# Patient Record
Sex: MALE | Race: BLACK OR AFRICAN AMERICAN | NOT HISPANIC OR LATINO | Employment: UNEMPLOYED | URBAN - METROPOLITAN AREA
[De-identification: names, ages, dates, MRNs, and addresses within clinical notes are randomized per-mention and may not be internally consistent; named-entity substitution may affect disease eponyms.]

---

## 2017-01-12 DIAGNOSIS — K21.9 GASTROESOPHAGEAL REFLUX DISEASE WITHOUT ESOPHAGITIS: ICD-10-CM

## 2017-01-12 DIAGNOSIS — E55.9 VITAMIN D INSUFFICIENCY: Primary | ICD-10-CM

## 2017-01-12 NOTE — TELEPHONE ENCOUNTER
Cholecalciferol (VITAMIN D) 2000 UNITS tablet      Last Written Prescription Date: 111/27/15  Last Fill Quantity: 100,  # refills: 3   Last Office Visit with [facility] or [facility] prescribing provider: 11/27/15                                               omeprazole 20 MG tablet      Last Written Prescription Date: 11/27/15  Last Fill Quantity: 90,  # refills: 3   Last Office Visit with [facility], [facility] or [facility] prescribing provider: 11/27/15

## 2017-01-13 RX ORDER — CHOLECALCIFEROL (VITAMIN D3) 50 MCG
2000 TABLET ORAL DAILY
Qty: 100 TABLET | Refills: 3 | OUTPATIENT
Start: 2017-01-13

## 2017-01-13 RX ORDER — OMEPRAZOLE 40 MG/1
40 CAPSULE, DELAYED RELEASE ORAL DAILY
Qty: 30 CAPSULE | Refills: 11 | OUTPATIENT
Start: 2017-01-13

## 2017-02-14 ENCOUNTER — OFFICE VISIT (OUTPATIENT)
Dept: FAMILY MEDICINE | Facility: CLINIC | Age: 32
End: 2017-02-14
Payer: COMMERCIAL

## 2017-02-14 VITALS
OXYGEN SATURATION: 96 % | DIASTOLIC BLOOD PRESSURE: 64 MMHG | SYSTOLIC BLOOD PRESSURE: 112 MMHG | TEMPERATURE: 96.6 F | BODY MASS INDEX: 22.79 KG/M2 | HEIGHT: 71 IN | HEART RATE: 70 BPM | WEIGHT: 162.8 LBS

## 2017-02-14 DIAGNOSIS — Z00.00 ROUTINE HISTORY AND PHYSICAL EXAMINATION OF ADULT: Primary | ICD-10-CM

## 2017-02-14 DIAGNOSIS — E55.9 VITAMIN D INSUFFICIENCY: ICD-10-CM

## 2017-02-14 DIAGNOSIS — K21.9 GASTROESOPHAGEAL REFLUX DISEASE WITHOUT ESOPHAGITIS: ICD-10-CM

## 2017-02-14 DIAGNOSIS — R63.1 ALWAYS THIRSTY: ICD-10-CM

## 2017-02-14 DIAGNOSIS — Z22.7 LATENT TUBERCULOSIS: ICD-10-CM

## 2017-02-14 DIAGNOSIS — E78.5 HYPERLIPIDEMIA LDL GOAL <160: ICD-10-CM

## 2017-02-14 LAB
ALBUMIN UR-MCNC: NEGATIVE MG/DL
ALT SERPL W P-5'-P-CCNC: 28 U/L (ref 0–70)
ANION GAP SERPL CALCULATED.3IONS-SCNC: 5 MMOL/L (ref 3–14)
APPEARANCE UR: CLEAR
BILIRUB UR QL STRIP: NEGATIVE
BUN SERPL-MCNC: 15 MG/DL (ref 7–30)
CALCIUM SERPL-MCNC: 9.3 MG/DL (ref 8.5–10.1)
CHLORIDE SERPL-SCNC: 104 MMOL/L (ref 94–109)
CHOLEST SERPL-MCNC: 214 MG/DL
CO2 SERPL-SCNC: 31 MMOL/L (ref 20–32)
COLOR UR AUTO: YELLOW
CREAT SERPL-MCNC: 1.24 MG/DL (ref 0.66–1.25)
ERYTHROCYTE [DISTWIDTH] IN BLOOD BY AUTOMATED COUNT: 12.9 % (ref 10–15)
GFR SERPL CREATININE-BSD FRML MDRD: 68 ML/MIN/1.7M2
GLUCOSE SERPL-MCNC: 86 MG/DL (ref 70–99)
GLUCOSE UR STRIP-MCNC: NEGATIVE MG/DL
HBA1C MFR BLD: 5.2 % (ref 4.3–6)
HCT VFR BLD AUTO: 44.2 % (ref 40–53)
HDLC SERPL-MCNC: 42 MG/DL
HGB BLD-MCNC: 15.3 G/DL (ref 13.3–17.7)
HGB UR QL STRIP: NEGATIVE
KETONES UR STRIP-MCNC: NEGATIVE MG/DL
LDLC SERPL CALC-MCNC: 134 MG/DL
LEUKOCYTE ESTERASE UR QL STRIP: NEGATIVE
MCH RBC QN AUTO: 29.5 PG (ref 26.5–33)
MCHC RBC AUTO-ENTMCNC: 34.6 G/DL (ref 31.5–36.5)
MCV RBC AUTO: 85 FL (ref 78–100)
NITRATE UR QL: NEGATIVE
NONHDLC SERPL-MCNC: 172 MG/DL
PH UR STRIP: 7 PH (ref 5–7)
PLATELET # BLD AUTO: 242 10E9/L (ref 150–450)
POTASSIUM SERPL-SCNC: 4.1 MMOL/L (ref 3.4–5.3)
RBC # BLD AUTO: 5.18 10E12/L (ref 4.4–5.9)
SODIUM SERPL-SCNC: 140 MMOL/L (ref 133–144)
SP GR UR STRIP: 1.02 (ref 1–1.03)
TRIGL SERPL-MCNC: 188 MG/DL
URN SPEC COLLECT METH UR: NORMAL
UROBILINOGEN UR STRIP-ACNC: 0.2 EU/DL (ref 0.2–1)
WBC # BLD AUTO: 5.1 10E9/L (ref 4–11)

## 2017-02-14 PROCEDURE — 80061 LIPID PANEL: CPT | Performed by: FAMILY MEDICINE

## 2017-02-14 PROCEDURE — 85027 COMPLETE CBC AUTOMATED: CPT | Performed by: FAMILY MEDICINE

## 2017-02-14 PROCEDURE — 81003 URINALYSIS AUTO W/O SCOPE: CPT | Performed by: FAMILY MEDICINE

## 2017-02-14 PROCEDURE — 99395 PREV VISIT EST AGE 18-39: CPT | Performed by: FAMILY MEDICINE

## 2017-02-14 PROCEDURE — 87338 HPYLORI STOOL AG IA: CPT | Performed by: FAMILY MEDICINE

## 2017-02-14 PROCEDURE — 83036 HEMOGLOBIN GLYCOSYLATED A1C: CPT | Performed by: FAMILY MEDICINE

## 2017-02-14 PROCEDURE — 82306 VITAMIN D 25 HYDROXY: CPT | Performed by: FAMILY MEDICINE

## 2017-02-14 PROCEDURE — 80048 BASIC METABOLIC PNL TOTAL CA: CPT | Performed by: FAMILY MEDICINE

## 2017-02-14 PROCEDURE — 36415 COLL VENOUS BLD VENIPUNCTURE: CPT | Performed by: FAMILY MEDICINE

## 2017-02-14 PROCEDURE — 84460 ALANINE AMINO (ALT) (SGPT): CPT | Performed by: FAMILY MEDICINE

## 2017-02-14 RX ORDER — OMEPRAZOLE 40 MG/1
40 CAPSULE, DELAYED RELEASE ORAL DAILY
Qty: 30 CAPSULE | Refills: 11 | Status: SHIPPED | OUTPATIENT
Start: 2017-02-14 | End: 2018-01-09

## 2017-02-14 RX ORDER — ISONIAZID 300 MG/1
300 TABLET ORAL DAILY
Qty: 90 TABLET | Refills: 2 | Status: SHIPPED | OUTPATIENT
Start: 2017-02-14 | End: 2018-01-09

## 2017-02-14 NOTE — PROGRESS NOTES
SUBJECTIVE:     CC: Paul Alcantara is an 32 year old male who presents for preventative health visit.     Healthy Habits:    Do you get at least three servings of calcium containing foods daily (dairy, green leafy vegetables, etc.)? yes    Amount of exercise or daily activities, outside of work: none    Problems taking medications regularly not applicable    Medication side effects: No    Have you had an eye exam in the past two years? yes    Do you see a dentist twice per year? yes    Do you have sleep apnea, excessive snoring or daytime drowsiness?no        Insomnia,     Today's PHQ-2 Score:   PHQ-2 ( 1999 Pfizer) 2/14/2017 11/27/2015   Q1: Little interest or pleasure in doing things 0 0   Q2: Feeling down, depressed or hopeless 0 0   PHQ-2 Score 0 0       Abuse: Current or Past(Physical, Sexual or Emotional)- No  Do you feel safe in your environment - Yes    Social History   Substance Use Topics     Smoking status: Never Smoker     Smokeless tobacco: Not on file     Alcohol use No     The patient does not drink >3 drinks per day nor >7 drinks per week.    Last PSA: No results found for: PSA    Recent Labs   Lab Test  11/27/15   1344   CHOL  234*   HDL  54   LDL  166*   TRIG  68   NHDL  180*       Reviewed orders with patient. Reviewed health maintenance and updated orders accordingly - No    All Histories reviewed and updated in Epic.  History reviewed. No pertinent past medical history.   History reviewed. No pertinent past surgical history.    ROS: has Gastroesophageal reflux disease without esophagitis; Infertility management; Vitamin D insufficiency; Hyperlipidemia LDL goal <160; and Latent tuberculosis on his problem list.  C: NEGATIVE for fever, chills, change in weight  I: NEGATIVE for worrisome rashes, moles or lesions  E: NEGATIVE for vision changes or irritation  ENT: NEGATIVE for ear, mouth and throat problems  R: NEGATIVE for significant cough or SOB  CV: NEGATIVE for chest pain, palpitations  or peripheral edema  GI: POSITIVE for abdominal pain epigastric, gas or bloating, heartburn or reflux and nausea   male: negative for dysuria, hematuria, decreased urinary stream, erectile dysfunction, urethral discharge  HISTORY OF PAINFUL INTERMITTENT TESTES   DISCHARGE WHEN STRAINING AT STOOL  INFERTILITY WORRIES   M: NEGATIVE for significant arthralgias or myalgia  N: NEGATIVE for weakness, dizziness or paresthesias  E: NEGATIVE for temperature intolerance, skin/hair changes  INCREASED THIRST AND HUNGER   H: NEGATIVE for bleeding problems  P: NEGATIVE for changes in mood or affect      Problem list, Medication list, Allergies, and Medical/Social/Surgical histories reviewed in [facility] and updated as appropriate.  Labs reviewed in EPIC  BP Readings from Last 3 Encounters:   02/14/17 112/64   11/27/15 106/72    Wt Readings from Last 3 Encounters:   02/14/17 162 lb 12.8 oz (73.8 kg)   11/27/15 158 lb 12.8 oz (72 kg)                  Patient Active Problem List   Diagnosis     Gastroesophageal reflux disease without esophagitis     Infertility management     Vitamin D insufficiency     Hyperlipidemia LDL goal <160     Latent tuberculosis     History reviewed. No pertinent past surgical history.    Social History   Substance Use Topics     Smoking status: Never Smoker     Smokeless tobacco: Not on file     Alcohol use No     Family History   Problem Relation Age of Onset     Family History Negative Mother      Family History Negative Father          Current Outpatient Prescriptions   Medication Sig Dispense Refill     omeprazole (PRILOSEC) 40 MG capsule Take 1 capsule (40 mg) by mouth daily Take 30-60 minutes before a meal. 30 capsule 11     ranitidine (ZANTAC) 150 MG tablet Take 1 tablet (150 mg) by mouth 2 times daily 60 tablet 11     isoniazid (NYDRAZID) 300 MG tablet Take 1 tablet (300 mg) by mouth daily 90 tablet 2     Cholecalciferol (VITAMIN D) 2000 UNITS tablet Take 2,000 Units by mouth daily 100 tablet 3  "    No Known Allergies  Recent Labs   Lab Test  02/14/17   0921  11/27/15   1344   A1C  5.2   --    LDL   --   166*   HDL   --   54   TRIG   --   68      OBJECTIVE:     /64 (BP Location: Right arm, Cuff Size: Adult Large)  Pulse 70  Temp 96.6  F (35.9  C) (Tympanic)  Ht 5' 10.5\" (1.791 m)  Wt 162 lb 12.8 oz (73.8 kg)  SpO2 96%  BMI 23.03 kg/m2  EXAM:  GENERAL: healthy, alert and no distress  EYES: Eyes grossly normal to inspection, PERRL and conjunctivae and sclerae normal  HENT: ear canals and TM's normal, nose and mouth without ulcers or lesions  NECK: no adenopathy, no asymmetry, masses, or scars and thyroid normal to palpation  RESP: lungs clear to auscultation - no rales, rhonchi or wheezes  CV: regular rate and rhythm, normal S1 S2, no S3 or S4, no murmur, click or rub, no peripheral edema and peripheral pulses strong  ABDOMEN: soft, nontender, no hepatosplenomegaly, no masses and bowel sounds normal   (male): normal male genitalia without lesions or urethral discharge, no hernia  MS: no gross musculoskeletal defects noted, no edema  SKIN: no suspicious lesions or rashes  NEURO: Normal strength and tone, mentation intact and speech normal  PSYCH: mentation appears normal, affect normal/bright  LYMPH: no cervical, supraclavicular, axillary, or inguinal adenopathy  Diabetic foot exam: normal DP and PT pulses, no trophic changes or ulcerative lesions, normal sensory exam and normal monofilament exam    ASSESSMENT/PLAN:         ICD-10-CM    1. Routine history and physical examination of adult Z00.00 UA reflex to Microscopic and Culture   2. Gastroesophageal reflux disease without esophagitis K21.9 omeprazole (PRILOSEC) 40 MG capsule     H Pylori antigen stool     ranitidine (ZANTAC) 150 MG tablet   3. Latent tuberculosis R76.11 ALT     isoniazid (NYDRAZID) 300 MG tablet   4. Vitamin D insufficiency E55.9 Vitamin D Deficiency   5. Hyperlipidemia LDL goal <160 E78.5 Lipid panel reflex to direct LDL   6. " "Always thirsty R63.1 Hemoglobin A1c     Basic metabolic panel     CBC with platelets       COUNSELING:  Reviewed preventive health counseling, as reflected in patient instructions        DELAYED TB POSITIVE TREATMENT       Regular exercise       Healthy diet/nutrition       Vision screening       Hearing screening         reports that he has never smoked. He does not have any smokeless tobacco history on file.    Estimated body mass index is 23.03 kg/(m^2) as calculated from the following:    Height as of this encounter: 5' 10.5\" (1.791 m).    Weight as of this encounter: 162 lb 12.8 oz (73.8 kg).       Counseling Resources:  ATP IV Guidelines  Pooled Cohorts Equation Calculator  FRAX Risk Assessment  ICSI Preventive Guidelines  Dietary Guidelines for Americans, 2010  USDA's MyPlate  ASA Prophylaxis  Lung CA Screening    AMINATA REYES MD  [facility]  "

## 2017-02-14 NOTE — NURSING NOTE
"Chief Complaint   Patient presents with     Physical       Initial /64 (BP Location: Right arm, Cuff Size: Adult Large)  Pulse 70  Temp 96.6  F (35.9  C) (Tympanic)  Ht 5' 10.5\" (1.791 m)  Wt 162 lb 12.8 oz (73.8 kg)  SpO2 96%  BMI 23.03 kg/m2 Estimated body mass index is 23.03 kg/(m^2) as calculated from the following:    Height as of this encounter: 5' 10.5\" (1.791 m).    Weight as of this encounter: 162 lb 12.8 oz (73.8 kg).  Medication Reconciliation: complete   Hortencia Gómez CMA    "

## 2017-02-14 NOTE — LETTER
"      [address]  [address]  [facility] [address] [phone]                                                                                                           Paul Sandhu Ise  [address]   [facility] [address]    February 15, 2017      Dear Paul,    The results of your recent tests were reviewed and are enclosed.     YOU HAVE H PYLORI   INFECTION IN STOMACH PLEASE COME BACK FOR ANTIBIOTIC TREATMENT   NORMAL URINE ANALYSIS   NORMAL COMPLETE BLOOD PANEL WBCS RBCS AND PLTS   NORMAL THREE MONTH GLUCOSE AVERAGE   NORMAL LIVER FUNCTION TEST   NORMAL GLUCOSE, RENAL AND BLOOD SALTS    BORDERLINE  TOTAL CHOLESTEROL   BORDERLINE  TRIGLYCERIDES   NORMAL HDL OR \"GOOD\" CHOLESTEROL   BORDERLINE  HIGH LDL OR \"BAD\" CHOLESTEROL   BORDERLINE  VERY LOW DENSITY CHOLESTEROL   Results for orders placed or performed in visit on 02/14/17   Hemoglobin A1c   Result Value Ref Range    Hemoglobin A1C 5.2 4.3 - 6.0 %   Basic metabolic panel   Result Value Ref Range    Sodium 140 133 - 144 mmol/L    Potassium 4.1 3.4 - 5.3 mmol/L    Chloride 104 94 - 109 mmol/L    Carbon Dioxide 31 20 - 32 mmol/L    Anion Gap 5 3 - 14 mmol/L    Glucose 86 70 - 99 mg/dL    Urea Nitrogen 15 7 - 30 mg/dL    Creatinine 1.24 0.66 - 1.25 mg/dL    GFR Estimate 68 >60 mL/min/1.7m2    GFR Estimate If Black 82 >60 mL/min/1.7m2    Calcium 9.3 8.5 - 10.1 mg/dL   Lipid panel reflex to direct LDL   Result Value Ref Range    Cholesterol 214 (H) <200 mg/dL    Triglycerides 188 (H) <150 mg/dL    HDL Cholesterol 42 >39 mg/dL    LDL Cholesterol Calculated 134 (H) <100 mg/dL    Non HDL Cholesterol 172 (H) <130 mg/dL   CBC with platelets   Result Value Ref Range    WBC 5.1 4.0 - 11.0 10e9/L    RBC Count 5.18 4.4 - 5.9 10e12/L    Hemoglobin 15.3 13.3 - 17.7 g/dL    Hematocrit 44.2 40.0 - 53.0 %    MCV 85 78 - 100 fl    MCH 29.5 26.5 - 33.0 pg    MCHC 34.6 31.5 - 36.5 g/dL    RDW 12.9 10.0 " - 15.0 %    Platelet Count 242 150 - 450 10e9/L   Vitamin D Deficiency   Result Value Ref Range    Vitamin D Deficiency screening 23 20 - 75 ug/L   UA reflex to Microscopic and Culture   Result Value Ref Range    Color Urine Yellow     Appearance Urine Clear     Glucose Urine Negative NEG mg/dL    Bilirubin Urine Negative NEG    Ketones Urine Negative NEG mg/dL    Specific Gravity Urine 1.020 1.003 - 1.035    Blood Urine Negative NEG    pH Urine 7.0 5.0 - 7.0 pH    Protein Albumin Urine Negative NEG mg/dL    Urobilinogen Urine 0.2 0.2 - 1.0 EU/dL    Nitrite Urine Negative NEG    Leukocyte Esterase Urine Negative NEG    Source Midstream Urine    ALT   Result Value Ref Range    ALT 28 0 - 70 U/L   H Pylori antigen stool   Result Value Ref Range    Specimen Description Feces     H Pylori Antigen (A)      Positive for Helicobacter pylori antigen by enzyme immunoassay. A positive   result indicates the presence of H. pylori antigen.      Micro Report Status FINAL 02/15/2017              Thank you for choosing [facility].  We appreciate the opportunity to serve you and look forward to supporting your healthcare needs in the future.    If you have any questions or concerns, please call me or my staff at [phone].      Sincerely,    Bandar Terry Jr MD

## 2017-02-14 NOTE — LETTER
"      [address]  [address]  [facility] [address] [phone]                                                                                                           Paul Sandhu Ise  [address]   [facility] [address]    February 17, 2017      Dear Paul,    The results of your recent tests were reviewed and are enclosed.   VITAMIN D OK KEEP TAKING VITAMIN D   YOU HAVE H PYLORI   INFECTION IN STOMACH PLEASE COME BACK FOR ANTIBIOTIC TREATMENT   NORMAL URINE ANALYSIS   NORMAL COMPLETE BLOOD PANEL WBCS RBCS AND PLTS   NORMAL THREE MONTH GLUCOSE AVERAGE   NORMAL LIVER FUNCTION TEST   NORMAL GLUCOSE, RENAL AND BLOOD SALTS    BORDERLINE  TOTAL CHOLESTEROL   BORDERLINE  TRIGLYCERIDES   NORMAL HDL OR \"GOOD\" CHOLESTEROL   BORDERLINE  HIGH LDL OR \"BAD\" CHOLESTEROL   BORDERLINE  VERY LOW DENSITY CHOLESTEROL   Results for orders placed or performed in visit on 02/14/17   Hemoglobin A1c   Result Value Ref Range    Hemoglobin A1C 5.2 4.3 - 6.0 %   Basic metabolic panel   Result Value Ref Range    Sodium 140 133 - 144 mmol/L    Potassium 4.1 3.4 - 5.3 mmol/L    Chloride 104 94 - 109 mmol/L    Carbon Dioxide 31 20 - 32 mmol/L    Anion Gap 5 3 - 14 mmol/L    Glucose 86 70 - 99 mg/dL    Urea Nitrogen 15 7 - 30 mg/dL    Creatinine 1.24 0.66 - 1.25 mg/dL    GFR Estimate 68 >60 mL/min/1.7m2    GFR Estimate If Black 82 >60 mL/min/1.7m2    Calcium 9.3 8.5 - 10.1 mg/dL   Lipid panel reflex to direct LDL   Result Value Ref Range    Cholesterol 214 (H) <200 mg/dL    Triglycerides 188 (H) <150 mg/dL    HDL Cholesterol 42 >39 mg/dL    LDL Cholesterol Calculated 134 (H) <100 mg/dL    Non HDL Cholesterol 172 (H) <130 mg/dL   CBC with platelets   Result Value Ref Range    WBC 5.1 4.0 - 11.0 10e9/L    RBC Count 5.18 4.4 - 5.9 10e12/L    Hemoglobin 15.3 13.3 - 17.7 g/dL    Hematocrit 44.2 40.0 - 53.0 %    MCV 85 78 - 100 fl    MCH 29.5 26.5 - 33.0 pg    MCHC 34.6 " 31.5 - 36.5 g/dL    RDW 12.9 10.0 - 15.0 %    Platelet Count 242 150 - 450 10e9/L   Vitamin D Deficiency   Result Value Ref Range    Vitamin D Deficiency screening 23 20 - 75 ug/L   UA reflex to Microscopic and Culture   Result Value Ref Range    Color Urine Yellow     Appearance Urine Clear     Glucose Urine Negative NEG mg/dL    Bilirubin Urine Negative NEG    Ketones Urine Negative NEG mg/dL    Specific Gravity Urine 1.020 1.003 - 1.035    Blood Urine Negative NEG    pH Urine 7.0 5.0 - 7.0 pH    Protein Albumin Urine Negative NEG mg/dL    Urobilinogen Urine 0.2 0.2 - 1.0 EU/dL    Nitrite Urine Negative NEG    Leukocyte Esterase Urine Negative NEG    Source Midstream Urine    ALT   Result Value Ref Range    ALT 28 0 - 70 U/L   H Pylori antigen stool   Result Value Ref Range    Specimen Description Feces     H Pylori Antigen (A)      Positive for Helicobacter pylori antigen by enzyme immunoassay. A positive   result indicates the presence of H. pylori antigen.      Micro Report Status FINAL 02/15/2017      Thank you for choosing [facility].  We appreciate the opportunity to serve you and look forward to supporting your healthcare needs in the future.    If you have any questions or concerns, please call me or my staff at [phone].      Sincerely,    Bandar Terry Jr MD

## 2017-02-14 NOTE — MR AVS SNAPSHOT
After Visit Summary   2/14/2017    Paul Alcantara    MRN: [id number]           Patient Information     Date Of Birth          1985        Visit Information        Provider Department      2/14/2017 8:00 AM Bandar Terry MD [facility]        Today's Diagnoses     Routine history and physical examination of adult    -  1    Gastroesophageal reflux disease without esophagitis        Latent tuberculosis        Vitamin D insufficiency        Hyperlipidemia LDL goal <160        Always thirsty          Care Instructions      Preventive Health Recommendations  Male Ages 26 - 39    Yearly exam:             See your health care provider every year in order to  o   Review health changes.   o   Discuss preventive care.    o   Review your medicines if your doctor has prescribed any.    You should be tested each year for STDs (sexually transmitted diseases), if you re at risk.     After age 35, talk to your provider about cholesterol testing. If you are at risk for heart disease, have your cholesterol tested at least every 5 years.     If you are at risk for diabetes, you should have a diabetes test (fasting glucose).  Shots: Get a flu shot each year. Get a tetanus shot every 10 years.     Nutrition:    Eat at least 5 servings of fruits and vegetables daily.     Eat whole-grain bread, whole-wheat pasta and brown rice instead of white grains and rice.     Talk to your provider about Calcium and Vitamin D.     Lifestyle    Exercise for at least 150 minutes a week (30 minutes a day, 5 days a week). This will help you control your weight and prevent disease.     Limit alcohol to one drink per day.     No smoking.     Wear sunscreen to prevent skin cancer.     See your dentist every six months for an exam and cleaning.           Follow-ups after your visit        Follow-up notes from your care team     Return in about 1 week (around 2/21/2017) for Routine Visit.     "  Future tests that were ordered for you today     Open Future Orders        Priority Expected Expires Ordered    H Pylori antigen stool Routine  3/16/2017 2017            Who to contact     If you have questions or need follow up information about today's clinic visit or your schedule please contact [facility] directly at [phone].  Normal or non-critical lab and imaging results will be communicated to you by MyChart, letter or phone within 4 business days after the clinic has received the results. If you do not hear from us within 7 days, please contact the clinic through [employer] or phone. If you have a critical or abnormal lab result, we will notify you by phone as soon as possible.  Submit refill requests through [employer] or call your pharmacy and they will forward the refill request to us. Please allow 3 business days for your refill to be completed.          Additional Information About Your Visit        [employer] Information     [employer] lets you send messages to your doctor, view your test results, renew your prescriptions, schedule appointments and more. To sign up, go to [email or web address] . Click on \"Log in\" on the left side of the screen, which will take you to the Welcome page. Then click on \"Sign up Now\" on the right side of the page.     You will be asked to enter the access code listed below, as well as some personal information. Please follow the directions to create your username and password.     Your access code is: [email or web address]  Expires: 5/15/2017 12:36 PM     Your access code will  in 90 days. If you need help or a new code, please call your [facility] clinic or [phone].        Care EveryWhere ID     This is your Care EveryWhere ID. This could be used by other organizations to access your [facility] medical records  [id number]        Your Vitals Were     Pulse Temperature Height Pulse Oximetry BMI (Body Mass Index)       70 96.6  F (35.9  C) " "(Tympanic) 5' 10.5\" (1.791 m) 96% 23.03 kg/m2        Blood Pressure from Last 3 Encounters:   02/14/17 112/64   11/27/15 106/72    Weight from Last 3 Encounters:   02/14/17 162 lb 12.8 oz (73.8 kg)   11/27/15 158 lb 12.8 oz (72 kg)              We Performed the Following     ALT     Basic metabolic panel     CBC with platelets     Hemoglobin A1c     Lipid panel reflex to direct LDL     UA reflex to Microscopic and Culture     Vitamin D Deficiency          Today's Medication Changes          These changes are accurate as of: 2/14/17 12:36 PM.  If you have any questions, ask your nurse or doctor.               Start taking these medicines.        Dose/Directions    isoniazid 300 MG tablet   Commonly known as:  NYDRAZID   Used for:  Latent tuberculosis   Started by:  Bandar Terry MD        Dose:  300 mg   Take 1 tablet (300 mg) by mouth daily   Quantity:  90 tablet   Refills:  2       ranitidine 150 MG tablet   Commonly known as:  ZANTAC   Used for:  Gastroesophageal reflux disease without esophagitis   Started by:  Bandar Terry MD        Dose:  150 mg   Take 1 tablet (150 mg) by mouth 2 times daily   Quantity:  60 tablet   Refills:  11            Where to get your medicines      These medications were sent to [facility]/pharmacy #[id number] [address] [address]     Phone:  [phone]     isoniazid 300 MG tablet    omeprazole 40 MG capsule    ranitidine 150 MG tablet                Primary Care Provider    None Specified       No primary provider on file.        Thank you!     Thank you for choosing [facility]  for your care. Our goal is always to provide you with excellent care. Hearing back from our patients is one way we can continue to improve our services. Please take a few minutes to complete the written survey that you may receive in the mail after your visit with us. Thank you!             Your Updated " Medication List - Protect others around you: Learn how to safely use, store and throw away your medicines at www.disposemymeds.org.          This list is accurate as of: 2/14/17 12:36 PM.  Always use your most recent med list.                   Brand Name Dispense Instructions for use    isoniazid 300 MG tablet    NYDRAZID    90 tablet    Take 1 tablet (300 mg) by mouth daily       omeprazole 40 MG capsule    priLOSEC    30 capsule    Take 1 capsule (40 mg) by mouth daily Take 30-60 minutes before a meal.       ranitidine 150 MG tablet    ZANTAC    60 tablet    Take 1 tablet (150 mg) by mouth 2 times daily       vitamin D 2000 UNITS tablet     100 tablet    Take 2,000 Units by mouth daily

## 2017-02-14 NOTE — PATIENT INSTRUCTIONS
Preventive Health Recommendations  Male Ages 26 - 39    Yearly exam:             See your health care provider every year in order to  o   Review health changes.   o   Discuss preventive care.    o   Review your medicines if your doctor has prescribed any.    You should be tested each year for STDs (sexually transmitted diseases), if you re at risk.     After age 35, talk to your provider about cholesterol testing. If you are at risk for heart disease, have your cholesterol tested at least every 5 years.     If you are at risk for diabetes, you should have a diabetes test (fasting glucose).  Shots: Get a flu shot each year. Get a tetanus shot every 10 years.     Nutrition:    Eat at least 5 servings of fruits and vegetables daily.     Eat whole-grain bread, whole-wheat pasta and brown rice instead of white grains and rice.     Talk to your provider about Calcium and Vitamin D.     Lifestyle    Exercise for at least 150 minutes a week (30 minutes a day, 5 days a week). This will help you control your weight and prevent disease.     Limit alcohol to one drink per day.     No smoking.     Wear sunscreen to prevent skin cancer.     See your dentist every six months for an exam and cleaning.

## 2017-02-15 LAB
DEPRECATED CALCIDIOL+CALCIFEROL SERPL-MC: 23 UG/L (ref 20–75)
H PYLORI AG STL QL IA: ABNORMAL
MICRO REPORT STATUS: ABNORMAL
SPECIMEN SOURCE: ABNORMAL

## 2017-02-15 NOTE — PROGRESS NOTES
"NORMAL URINE ANALYSIS   NORMAL COMPLETE BLOOD PANEL WBCS RBCS AND PLTS   NORMAL THREE MONTH GLUCOSE AVERAGE   NORMAL LIVER FUNCTION TEST   NORMAL GLUCOSE, RENAL AND BLOOD SALTS    BORDERLINE  TOTAL CHOLESTEROL   BORDERLINE  TRIGLYCERIDES   NORMAL HDL OR \"GOOD\" CHOLESTEROL   BORDERLINE  HIGH LDL OR \"BAD\" CHOLESTEROL   BORDERLINE  VERY LOW DENSITY CHOLESTEROL     AMINATA REYES JR., MD"

## 2017-02-16 NOTE — PROGRESS NOTES
"Please send normal lab letter when labs are complete  VITAMIN D OK KEEP TAKING VITAMIN D   YOU HAVE H PYLORI   INFECTION IN STOMACH PLEASE COME BACK FOR ANTIBIOTIC TREATMENT   NORMAL URINE ANALYSIS   NORMAL COMPLETE BLOOD PANEL WBCS RBCS AND PLTS   NORMAL THREE MONTH GLUCOSE AVERAGE   NORMAL LIVER FUNCTION TEST   NORMAL GLUCOSE, RENAL AND BLOOD SALTS    BORDERLINE  TOTAL CHOLESTEROL   BORDERLINE  TRIGLYCERIDES   NORMAL HDL OR \"GOOD\" CHOLESTEROL   BORDERLINE  HIGH LDL OR \"BAD\" CHOLESTEROL   BORDERLINE  VERY LOW DENSITY CHOLESTEROL   AMINATA REYES JR., MD"

## 2017-05-30 ENCOUNTER — TELEPHONE (OUTPATIENT)
Dept: NURSING | Facility: CLINIC | Age: 32
End: 2017-05-30

## 2017-05-30 NOTE — TELEPHONE ENCOUNTER
Paul's wife called in and wanted appt for her . She states that he has shortness of breath. It started last night when he layed down.  She thinks that his color is OK. Doesn't think he is clammy. Thinks his mucous is clear. Does not know if he has asthma, doen's think he can take as deep a breath. Not sure about temp.  Dr Terry is off until Thursday and then his schedule is full until Tuesday.  They will go to the ER

## 2017-12-02 DIAGNOSIS — Z22.7 LATENT TUBERCULOSIS: ICD-10-CM

## 2017-12-06 NOTE — TELEPHONE ENCOUNTER
LOV 2/14/17    Requested Prescriptions   Pending Prescriptions Disp Refills     isoniazid (NYDRAZID) 300 MG tablet [Pharmacy Med Name: ISONIAZID 300 MG TABLET] 90 tablet 2     Sig: TAKE 1 TABLET (300 MG) BY MOUTH DAILY    There is no refill protocol information for this order

## 2018-01-09 ENCOUNTER — OFFICE VISIT (OUTPATIENT)
Dept: FAMILY MEDICINE | Facility: CLINIC | Age: 33
End: 2018-01-09
Payer: COMMERCIAL

## 2018-01-09 ENCOUNTER — RADIANT APPOINTMENT (OUTPATIENT)
Dept: GENERAL RADIOLOGY | Facility: CLINIC | Age: 33
End: 2018-01-09
Attending: FAMILY MEDICINE
Payer: COMMERCIAL

## 2018-01-09 VITALS
TEMPERATURE: 96.7 F | RESPIRATION RATE: 18 BRPM | SYSTOLIC BLOOD PRESSURE: 98 MMHG | BODY MASS INDEX: 23.2 KG/M2 | DIASTOLIC BLOOD PRESSURE: 64 MMHG | WEIGHT: 164 LBS | HEART RATE: 64 BPM

## 2018-01-09 DIAGNOSIS — L70.0 ACNE VULGARIS: ICD-10-CM

## 2018-01-09 DIAGNOSIS — R07.89 CHEST WALL PAIN: ICD-10-CM

## 2018-01-09 DIAGNOSIS — J98.4 SCARRING OF LUNG: ICD-10-CM

## 2018-01-09 DIAGNOSIS — E78.5 HYPERLIPIDEMIA LDL GOAL <160: ICD-10-CM

## 2018-01-09 DIAGNOSIS — K21.9 GASTROESOPHAGEAL REFLUX DISEASE WITHOUT ESOPHAGITIS: Primary | ICD-10-CM

## 2018-01-09 DIAGNOSIS — G44.219 EPISODIC TENSION-TYPE HEADACHE, NOT INTRACTABLE: ICD-10-CM

## 2018-01-09 DIAGNOSIS — G47.62 NOCTURNAL LEG CRAMPS: ICD-10-CM

## 2018-01-09 DIAGNOSIS — E55.9 VITAMIN D INSUFFICIENCY: ICD-10-CM

## 2018-01-09 PROCEDURE — 99214 OFFICE O/P EST MOD 30 MIN: CPT | Performed by: FAMILY MEDICINE

## 2018-01-09 PROCEDURE — 71046 X-RAY EXAM CHEST 2 VIEWS: CPT | Mod: FY

## 2018-01-09 RX ORDER — ASPIRIN 81 MG
TABLET,CHEWABLE ORAL AT BEDTIME
Qty: 56 G | Refills: 11 | Status: ON HOLD | OUTPATIENT
Start: 2018-01-09 | End: 2018-04-08

## 2018-01-09 RX ORDER — ACETAMINOPHEN 500 MG
1000 TABLET ORAL 2 TIMES DAILY PRN
Qty: 100 TABLET | Refills: 3 | Status: ON HOLD | OUTPATIENT
Start: 2018-01-09 | End: 2018-04-08

## 2018-01-09 RX ORDER — OMEPRAZOLE 40 MG/1
40 CAPSULE, DELAYED RELEASE ORAL DAILY
Qty: 30 CAPSULE | Refills: 11 | Status: SHIPPED | OUTPATIENT
Start: 2018-01-09 | End: 2019-03-04

## 2018-01-09 RX ORDER — CHOLECALCIFEROL (VITAMIN D3) 50 MCG
2000 TABLET ORAL DAILY
Qty: 100 TABLET | Refills: 11 | Status: SHIPPED | OUTPATIENT
Start: 2018-01-09 | End: 2019-03-04

## 2018-01-09 NOTE — PATIENT INSTRUCTIONS
(K21.9) Gastroesophageal reflux disease without esophagitis  (primary encounter diagnosis)  Comment:    Plan: omeprazole (PRILOSEC) 40 MG capsule             (E55.9) Vitamin D insufficiency  Comment:    Plan: Cholecalciferol (VITAMIN D) 2000 UNITS tablet             (E78.5) Hyperlipidemia LDL goal <160  Comment:    Plan:      (R07.89) Chest wall pain  Comment:    Plan: XR Chest 2 Views             (G47.62) Nocturnal leg cramps  Comment:    Plan: Capsaicin 0.1 % cream             (G44.219) Episodic tension-type headache, not intractable  Comment:    Plan: acetaminophen (TYLENOL) 500 MG tablet

## 2018-01-09 NOTE — LETTER
January 10, 2018      Paul Godoybetty Alcantara  [address]   [facility] [address]        Dear Mr.Abdullahi Alcantara,    We are writing to inform you of your test results.        Resulted Orders   XR Chest 2 Views    Narrative    XR CHEST 2 VW   1/9/2018 11:53 AM     HISTORY: ; Chest wall pain    COMPARISON: None.      Impression    IMPRESSION: Chronic interstitial/bronchiectatic changes in the lungs.  No definite acute infiltrate.    TAMMIE LLOYD MD       If you have any questions or concerns, please call the clinic at the number listed above.       Sincerely,        Bandar Terry MD

## 2018-01-09 NOTE — MR AVS SNAPSHOT
After Visit Summary   1/9/2018    Paul Alcantara    MRN: [id number]           Patient Information     Date Of Birth          1985        Visit Information        Provider Department      1/9/2018 10:45 AM Bandar Terry MD [facility]        Today's Diagnoses     Gastroesophageal reflux disease without esophagitis    -  1    Vitamin D insufficiency        Hyperlipidemia LDL goal <160        Chest wall pain        Nocturnal leg cramps        Episodic tension-type headache, not intractable          Care Instructions    (K21.9) Gastroesophageal reflux disease without esophagitis  (primary encounter diagnosis)  Comment:    Plan: omeprazole (PRILOSEC) 40 MG capsule             (E55.9) Vitamin D insufficiency  Comment:    Plan: Cholecalciferol (VITAMIN D) 2000 UNITS tablet             (E78.5) Hyperlipidemia LDL goal <160  Comment:    Plan:      (R07.89) Chest wall pain  Comment:    Plan: XR Chest 2 Views             (G47.62) Nocturnal leg cramps  Comment:    Plan: Capsaicin 0.1 % cream             (G44.219) Episodic tension-type headache, not intractable  Comment:    Plan: acetaminophen (TYLENOL) 500 MG tablet                            Follow-ups after your visit        Future tests that were ordered for you today     Open Future Orders        Priority Expected Expires Ordered    XR Chest 2 Views Routine 1/9/2018 1/9/2019 1/9/2018            Who to contact     If you have questions or need follow up information about today's clinic visit or your schedule please contact [facility] directly at [phone].  Normal or non-critical lab and imaging results will be communicated to you by MyChart, letter or phone within 4 business days after the clinic has received the results. If you do not hear from us within 7 days, please contact the clinic through MyChart or phone. If you have a critical or abnormal lab result, we will  "notify you by phone as soon as possible.  Submit refill requests through [employer] or call your pharmacy and they will forward the refill request to us. Please allow 3 business days for your refill to be completed.          Additional Information About Your Visit        [email or web address] Information     [employer] lets you send messages to your doctor, view your test results, renew your prescriptions, schedule appointments and more. To sign up, go to [email or web address] . Click on \"Log in\" on the left side of the screen, which will take you to the Welcome page. Then click on \"Sign up Now\" on the right side of the page.     You will be asked to enter the access code listed below, as well as some personal information. Please follow the directions to create your username and password.     Your access code is: [email or web address]  Expires: 2018 11:23 AM     Your access code will  in 90 days. If you need help or a new code, please call your [facility] clinic or [phone].        Care EveryWhere ID     This is your Care EveryWhere ID. This could be used by other organizations to access your [facility] medical records  [id number]        Your Vitals Were     Pulse Temperature Respirations BMI (Body Mass Index)          64 96.7  F (35.9  C) (Tympanic) 18 23.2 kg/m2         Blood Pressure from Last 3 Encounters:   18 98/64   17 112/64   11/27/15 106/72    Weight from Last 3 Encounters:   18 164 lb (74.4 kg)   17 162 lb 12.8 oz (73.8 kg)   11/27/15 158 lb 12.8 oz (72 kg)                 Today's Medication Changes          These changes are accurate as of: 18 11:23 AM.  If you have any questions, ask your nurse or doctor.               Start taking these medicines.        Dose/Directions    acetaminophen 500 MG tablet   Commonly known as:  TYLENOL   Used for:  Episodic tension-type headache, not intractable   Started by:  Bandar Terry MD        Dose:  1000 mg   Take 2 tablets (1,000 mg) by mouth " 2 times daily as needed for mild pain   Quantity:  100 tablet   Refills:  3       Capsaicin 0.1 % cream   Used for:  Nocturnal leg cramps   Started by:  Bandar Terry MD        Apply topically At Bedtime   Quantity:  56 g   Refills:  11            Where to get your medicines      These medications were sent to [facility]/pharmacy #[id number] - [place], [place] - [address]  [address] [address]     Phone:  [phone]     acetaminophen 500 MG tablet    Capsaicin 0.1 % cream    omeprazole 40 MG capsule    vitamin D 2000 UNITS tablet                Primary Care Provider Office Phone # Fax #    Bandar Terry [phone] [address]  [facility] [address]        Equal Access to Services     MARCOS MONTES : Hadii afshin almanzar hadasho Soomaali, waaxda luqadaha, qaybta kaalmada adeegyada, waxay tate segura . So [facility] [phone].    ATENCIÓN: Si habla español, tiene a machado disposición servicios gratuitos de asistencia lingüística. Llame al [phone].    We comply with applicable federal civil rights laws and [place] laws. We do not discriminate on the basis of race, color, national origin, age, disability, sex, sexual orientation, or gender identity.            Thank you!     Thank you for choosing [facility]  for your care. Our goal is always to provide you with excellent care. Hearing back from our patients is one way we can continue to improve our services. Please take a few minutes to complete the written survey that you may receive in the mail after your visit with us. Thank you!             Your Updated Medication List - Protect others around you: Learn how to safely use, store and throw away your medicines at www.disposemymeds.org.          This list is accurate as of: 1/9/18 11:23 AM.  Always use your most recent med list.                   Brand Name Dispense Instructions for use Diagnosis     acetaminophen 500 MG tablet    TYLENOL    100 tablet    Take 2 tablets (1,000 mg) by mouth 2 times daily as needed for mild pain    Episodic tension-type headache, not intractable       Capsaicin 0.1 % cream     56 g    Apply topically At Bedtime    Nocturnal leg cramps       omeprazole 40 MG capsule    priLOSEC    30 capsule    Take 1 capsule (40 mg) by mouth daily Take 30-60 minutes before a meal.    Gastroesophageal reflux disease without esophagitis       ranitidine 150 MG tablet    ZANTAC    60 tablet    Take 1 tablet (150 mg) by mouth 2 times daily    Gastroesophageal reflux disease without esophagitis       vitamin D 2000 UNITS tablet     100 tablet    Take 2,000 Units by mouth daily    Vitamin D insufficiency
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
## 2018-01-09 NOTE — NURSING NOTE
"Chief Complaint   Patient presents with     Pain     pain in arms and legs       Initial BP 98/64  Pulse 64  Temp 96.7  F (35.9  C) (Tympanic)  Resp 18  Wt 164 lb (74.4 kg)  BMI 23.2 kg/m2 Estimated body mass index is 23.2 kg/(m^2) as calculated from the following:    Height as of 2/14/17: 5' 10.5\" (1.791 m).    Weight as of this encounter: 164 lb (74.4 kg).  Medication Reconciliation: complete   Hortencia Góemz CMA    "

## 2018-01-09 NOTE — TELEPHONE ENCOUNTER
SAMANTHAI to Dr. Bandar Terry  Spoke with pharmacist at [facility] and patient has gone through 9 months of the [facility].

## 2018-01-10 NOTE — PROGRESS NOTES
Please send copy of xray to patient      XR CHEST 2 VW   1/9/2018 11:53 AM     HISTORY: ; Chest wall pain    COMPARISON: None.           Impression            IMPRESSION: Chronic interstitial/bronchiectatic changes in the lungs.  No definite acute infiltrate.    MD Bandar MURRELL Jr., MD

## 2018-01-16 ENCOUNTER — TELEPHONE (OUTPATIENT)
Dept: FAMILY MEDICINE | Facility: CLINIC | Age: 33
End: 2018-01-16

## 2018-01-16 RX ORDER — CLINDAMYCIN PHOSPHATE 10 UG/ML
LOTION TOPICAL 2 TIMES DAILY
Qty: 60 ML | Refills: 4 | Status: ON HOLD | OUTPATIENT
Start: 2018-01-16 | End: 2018-04-08

## 2018-01-16 RX ORDER — ISONIAZID 300 MG/1
TABLET ORAL
Qty: 90 TABLET | Refills: 0
Start: 2018-01-16

## 2018-01-16 NOTE — TELEPHONE ENCOUNTER
DONE WITH ISONIAZIDE     THIS MAY BE STOPPED     NOTIFY PHARMACY AND PATIENT     AMINATA REYES JR., MD

## 2018-02-19 ENCOUNTER — APPOINTMENT (OUTPATIENT)
Dept: GENERAL RADIOLOGY | Facility: CLINIC | Age: 33
End: 2018-02-19
Attending: EMERGENCY MEDICINE
Payer: COMMERCIAL

## 2018-02-19 ENCOUNTER — HOSPITAL ENCOUNTER (EMERGENCY)
Facility: CLINIC | Age: 33
Discharge: HOME | End: 2018-02-19
Attending: EMERGENCY MEDICINE | Admitting: EMERGENCY MEDICINE
Payer: COMMERCIAL

## 2018-02-19 VITALS
RESPIRATION RATE: 16 BRPM | OXYGEN SATURATION: 97 % | DIASTOLIC BLOOD PRESSURE: 84 MMHG | TEMPERATURE: 98.3 F | SYSTOLIC BLOOD PRESSURE: 132 MMHG
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
## 2018-02-19 DIAGNOSIS — S40.029A: ICD-10-CM

## 2018-02-19 DIAGNOSIS — S16.1XXA CERVICAL STRAIN, INITIAL ENCOUNTER: ICD-10-CM

## 2018-02-19 DIAGNOSIS — S20.219A CONTUSION OF CHEST WALL, UNSPECIFIED LATERALITY, INITIAL ENCOUNTER: ICD-10-CM

## 2018-02-19 PROCEDURE — 25000132 ZZH RX MED GY IP 250 OP 250 PS 637: Performed by: EMERGENCY MEDICINE

## 2018-02-19 PROCEDURE — 99284 EMERGENCY DEPT VISIT MOD MDM: CPT

## 2018-02-19 PROCEDURE — 72040 X-RAY EXAM NECK SPINE 2-3 VW: CPT

## 2018-02-19 PROCEDURE — 71046 X-RAY EXAM CHEST 2 VIEWS: CPT

## 2018-02-19 RX ORDER — IBUPROFEN 600 MG/1
600 TABLET, FILM COATED ORAL ONCE
Status: COMPLETED | OUTPATIENT
Start: 2018-02-19 | End: 2018-02-19

## 2018-02-19 RX ADMIN — IBUPROFEN 600 MG: 600 TABLET ORAL at 20:16

## 2018-02-19 ASSESSMENT — ENCOUNTER SYMPTOMS
NECK STIFFNESS: 0
WOUND: 0
HEADACHES: 1
DIARRHEA: 0
NUMBNESS: 0
SHORTNESS OF BREATH: 0
BACK PAIN: 0
WEAKNESS: 0
ARTHRALGIAS: 1
LIGHT-HEADEDNESS: 0
FEVER: 0
VOMITING: 0
COUGH: 0
DYSURIA: 0
CHILLS: 0
NECK PAIN: 1
NAUSEA: 0
MYALGIAS: 1
DIZZINESS: 0
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
## 2018-02-19 NOTE — ED AVS SNAPSHOT
Emergency Department    [address] [place] [address]    Phone:  [phone]    Fax:  [phone]                                       Paul Alcantara   MRN: [id number]    Department:   Emergency Department   Date of Visit:  2/19/2018           After Visit Summary Signature Page     I have received my discharge instructions, and my questions have been answered. I have discussed any challenges I see with this plan with the nurse or doctor.    ..........................................................................................................................................  Patient/Patient Representative Signature      ..........................................................................................................................................  Patient Representative Print Name and Relationship to Patient    ..................................................               ................................................  Date                                            Time    ..........................................................................................................................................  Reviewed by Signature/Title    ...................................................              ..............................................  Date                                                            Time
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
## 2018-04-07 ENCOUNTER — OFFICE VISIT (OUTPATIENT)
Dept: URGENT CARE | Facility: URGENT CARE | Age: 33
End: 2018-04-07
Payer: COMMERCIAL

## 2018-04-07 ENCOUNTER — APPOINTMENT (OUTPATIENT)
Dept: GENERAL RADIOLOGY | Facility: CLINIC | Age: 33
DRG: 871 | End: 2018-04-07
Attending: EMERGENCY MEDICINE
Payer: COMMERCIAL

## 2018-04-07 ENCOUNTER — HOSPITAL ENCOUNTER (INPATIENT)
Facility: CLINIC | Age: 33
LOS: 1 days | Discharge: HOME | DRG: 871 | End: 2018-04-08
Attending: EMERGENCY MEDICINE | Admitting: INTERNAL MEDICINE
Payer: COMMERCIAL

## 2018-04-07 VITALS
OXYGEN SATURATION: 94 % | TEMPERATURE: 103.1 F | HEART RATE: 131 BPM | DIASTOLIC BLOOD PRESSURE: 54 MMHG | RESPIRATION RATE: 18 BRPM | SYSTOLIC BLOOD PRESSURE: 89 MMHG

## 2018-04-07 DIAGNOSIS — J10.1 INFLUENZA B: Primary | ICD-10-CM

## 2018-04-07 DIAGNOSIS — A41.9 SEVERE SEPSIS (H): ICD-10-CM

## 2018-04-07 DIAGNOSIS — R50.9 FEVER AND CHILLS: Primary | ICD-10-CM

## 2018-04-07 DIAGNOSIS — R65.20 SEVERE SEPSIS (H): ICD-10-CM

## 2018-04-07 DIAGNOSIS — G44.219 EPISODIC TENSION-TYPE HEADACHE, NOT INTRACTABLE: ICD-10-CM

## 2018-04-07 LAB
ALBUMIN SERPL-MCNC: 3.8 G/DL (ref 3.4–5)
ALBUMIN UR-MCNC: NEGATIVE MG/DL
ALP SERPL-CCNC: 136 U/L (ref 40–150)
ALT SERPL W P-5'-P-CCNC: 64 U/L (ref 0–70)
ANION GAP SERPL CALCULATED.3IONS-SCNC: 8 MMOL/L (ref 3–14)
APPEARANCE UR: CLEAR
AST SERPL W P-5'-P-CCNC: 44 U/L (ref 0–45)
BASE EXCESS BLDV CALC-SCNC: 1 MMOL/L
BASOPHILS # BLD AUTO: 0 10E9/L (ref 0–0.2)
BASOPHILS NFR BLD AUTO: 0.1 %
BILIRUB SERPL-MCNC: 0.6 MG/DL (ref 0.2–1.3)
BILIRUB UR QL STRIP: NEGATIVE
BUN SERPL-MCNC: 11 MG/DL (ref 7–30)
CALCIUM SERPL-MCNC: 8.7 MG/DL (ref 8.5–10.1)
CHLORIDE SERPL-SCNC: 102 MMOL/L (ref 94–109)
CO2 SERPL-SCNC: 26 MMOL/L (ref 20–32)
COLOR UR AUTO: NORMAL
CREAT SERPL-MCNC: 1.25 MG/DL (ref 0.66–1.25)
DIFFERENTIAL METHOD BLD: ABNORMAL
EOSINOPHIL # BLD AUTO: 0 10E9/L (ref 0–0.7)
EOSINOPHIL NFR BLD AUTO: 0.5 %
ERYTHROCYTE [DISTWIDTH] IN BLOOD BY AUTOMATED COUNT: 12.5 % (ref 10–15)
FLUAV+FLUBV AG SPEC QL: NEGATIVE
FLUAV+FLUBV AG SPEC QL: NEGATIVE
GFR SERPL CREATININE-BSD FRML MDRD: 67 ML/MIN/1.7M2
GLUCOSE CSF-MCNC: 77 MG/DL (ref 40–70)
GLUCOSE SERPL-MCNC: 116 MG/DL (ref 70–99)
GLUCOSE UR STRIP-MCNC: NEGATIVE MG/DL
GRAM STN SPEC: NORMAL
HCO3 BLDV-SCNC: 26 MMOL/L (ref 21–28)
HCT VFR BLD AUTO: 44.2 % (ref 40–53)
HGB BLD-MCNC: 14.9 G/DL (ref 13.3–17.7)
HGB UR QL STRIP: NEGATIVE
IMM GRANULOCYTES # BLD: 0 10E9/L (ref 0–0.4)
IMM GRANULOCYTES NFR BLD: 0.5 %
KETONES UR STRIP-MCNC: NEGATIVE MG/DL
LACTATE BLD-SCNC: 2.7 MMOL/L (ref 0.7–2)
LACTATE SERPL-SCNC: 1.6 MMOL/L (ref 0.4–2)
LEUKOCYTE ESTERASE UR QL STRIP: NEGATIVE
LYMPHOCYTES # BLD AUTO: 0.4 10E9/L (ref 0.8–5.3)
LYMPHOCYTES NFR BLD AUTO: 5.6 %
MCH RBC QN AUTO: 29.1 PG (ref 26.5–33)
MCHC RBC AUTO-ENTMCNC: 33.7 G/DL (ref 31.5–36.5)
MCV RBC AUTO: 86 FL (ref 78–100)
MONOCYTES # BLD AUTO: 0.7 10E9/L (ref 0–1.3)
MONOCYTES NFR BLD AUTO: 9.1 %
NEUTROPHILS # BLD AUTO: 6.2 10E9/L (ref 1.6–8.3)
NEUTROPHILS NFR BLD AUTO: 84.2 %
NITRATE UR QL: NEGATIVE
NRBC # BLD AUTO: 0 10*3/UL
NRBC BLD AUTO-RTO: 0 /100
O2/TOTAL GAS SETTING VFR VENT: NORMAL %
OXYHGB MFR BLDV: 58 %
PCO2 BLDV: 43 MM HG (ref 40–50)
PH BLDV: 7.39 PH (ref 7.32–7.43)
PH UR STRIP: 6 PH (ref 5–7)
PLATELET # BLD AUTO: 166 10E9/L (ref 150–450)
PO2 BLDV: 29 MM HG (ref 25–47)
POTASSIUM SERPL-SCNC: 3.4 MMOL/L (ref 3.4–5.3)
PROT CSF-MCNC: 26 MG/DL (ref 15–60)
PROT SERPL-MCNC: 7.8 G/DL (ref 6.8–8.8)
RBC # BLD AUTO: 5.12 10E12/L (ref 4.4–5.9)
RBC #/AREA URNS AUTO: 0 /HPF (ref 0–2)
SODIUM SERPL-SCNC: 136 MMOL/L (ref 133–144)
SOURCE: NORMAL
SP GR UR STRIP: 1 (ref 1–1.03)
SPECIMEN SOURCE: NORMAL
SPECIMEN SOURCE: NORMAL
UROBILINOGEN UR STRIP-MCNC: 0 MG/DL (ref 0–2)
WBC # BLD AUTO: 7.4 10E9/L (ref 4–11)
WBC #/AREA URNS AUTO: <1 /HPF (ref 0–5)

## 2018-04-07 PROCEDURE — 71045 X-RAY EXAM CHEST 1 VIEW: CPT

## 2018-04-07 PROCEDURE — 87804 INFLUENZA ASSAY W/OPTIC: CPT | Performed by: EMERGENCY MEDICINE

## 2018-04-07 PROCEDURE — 85025 COMPLETE CBC W/AUTO DIFF WBC: CPT | Performed by: EMERGENCY MEDICINE

## 2018-04-07 PROCEDURE — 81001 URINALYSIS AUTO W/SCOPE: CPT | Performed by: EMERGENCY MEDICINE

## 2018-04-07 PROCEDURE — 82945 GLUCOSE OTHER FLUID: CPT | Performed by: EMERGENCY MEDICINE

## 2018-04-07 PROCEDURE — 12000000 ZZH R&B MED SURG/OB

## 2018-04-07 PROCEDURE — 36415 COLL VENOUS BLD VENIPUNCTURE: CPT

## 2018-04-07 PROCEDURE — 25000132 ZZH RX MED GY IP 250 OP 250 PS 637: Performed by: EMERGENCY MEDICINE

## 2018-04-07 PROCEDURE — 87205 SMEAR GRAM STAIN: CPT | Performed by: EMERGENCY MEDICINE

## 2018-04-07 PROCEDURE — 82805 BLOOD GASES W/O2 SATURATION: CPT | Performed by: EMERGENCY MEDICINE

## 2018-04-07 PROCEDURE — 87631 RESP VIRUS 3-5 TARGETS: CPT | Performed by: EMERGENCY MEDICINE

## 2018-04-07 PROCEDURE — 83605 ASSAY OF LACTIC ACID: CPT | Performed by: EMERGENCY MEDICINE

## 2018-04-07 PROCEDURE — 96361 HYDRATE IV INFUSION ADD-ON: CPT

## 2018-04-07 PROCEDURE — 62270 DX LMBR SPI PNXR: CPT

## 2018-04-07 PROCEDURE — 99285 EMERGENCY DEPT VISIT HI MDM: CPT | Mod: 25

## 2018-04-07 PROCEDURE — 89050 BODY FLUID CELL COUNT: CPT | Performed by: EMERGENCY MEDICINE

## 2018-04-07 PROCEDURE — 96365 THER/PROPH/DIAG IV INF INIT: CPT

## 2018-04-07 PROCEDURE — 93005 ELECTROCARDIOGRAM TRACING: CPT

## 2018-04-07 PROCEDURE — 25000128 H RX IP 250 OP 636: Performed by: EMERGENCY MEDICINE

## 2018-04-07 PROCEDURE — 80053 COMPREHEN METABOLIC PANEL: CPT | Performed by: EMERGENCY MEDICINE

## 2018-04-07 PROCEDURE — 87070 CULTURE OTHR SPECIMN AEROBIC: CPT | Performed by: EMERGENCY MEDICINE

## 2018-04-07 PROCEDURE — 84157 ASSAY OF PROTEIN OTHER: CPT | Performed by: EMERGENCY MEDICINE

## 2018-04-07 PROCEDURE — 96375 TX/PRO/DX INJ NEW DRUG ADDON: CPT

## 2018-04-07 PROCEDURE — 99223 1ST HOSP IP/OBS HIGH 75: CPT | Mod: AI | Performed by: PHYSICIAN ASSISTANT

## 2018-04-07 PROCEDURE — 99207 ZZC OFFICE-HOSPITAL ADMIT: CPT | Performed by: NURSE PRACTITIONER

## 2018-04-07 PROCEDURE — 87086 URINE CULTURE/COLONY COUNT: CPT | Performed by: EMERGENCY MEDICINE

## 2018-04-07 PROCEDURE — 87040 BLOOD CULTURE FOR BACTERIA: CPT | Performed by: EMERGENCY MEDICINE

## 2018-04-07 RX ORDER — SODIUM CHLORIDE, SODIUM LACTATE, POTASSIUM CHLORIDE, CALCIUM CHLORIDE 600; 310; 30; 20 MG/100ML; MG/100ML; MG/100ML; MG/100ML
INJECTION, SOLUTION INTRAVENOUS CONTINUOUS
Status: DISCONTINUED | OUTPATIENT
Start: 2018-04-07 | End: 2018-04-08

## 2018-04-07 RX ORDER — METOCLOPRAMIDE HYDROCHLORIDE 5 MG/ML
10 INJECTION INTRAMUSCULAR; INTRAVENOUS ONCE
Status: COMPLETED | OUTPATIENT
Start: 2018-04-07 | End: 2018-04-07

## 2018-04-07 RX ORDER — DIPHENHYDRAMINE HYDROCHLORIDE 50 MG/ML
12.5 INJECTION INTRAMUSCULAR; INTRAVENOUS ONCE
Status: COMPLETED | OUTPATIENT
Start: 2018-04-07 | End: 2018-04-07

## 2018-04-07 RX ORDER — ACETAMINOPHEN 500 MG
1000 TABLET ORAL EVERY 4 HOURS PRN
Status: DISCONTINUED | OUTPATIENT
Start: 2018-04-07 | End: 2018-04-08

## 2018-04-07 RX ORDER — LIDOCAINE HYDROCHLORIDE 20 MG/ML
INJECTION, SOLUTION INFILTRATION; PERINEURAL
Status: DISCONTINUED
Start: 2018-04-07 | End: 2018-04-08 | Stop reason: HOSPADM

## 2018-04-07 RX ORDER — KETOROLAC TROMETHAMINE 15 MG/ML
15 INJECTION, SOLUTION INTRAMUSCULAR; INTRAVENOUS ONCE
Status: COMPLETED | OUTPATIENT
Start: 2018-04-07 | End: 2018-04-07

## 2018-04-07 RX ADMIN — DIPHENHYDRAMINE HYDROCHLORIDE 12.5 MG: 50 INJECTION, SOLUTION INTRAMUSCULAR; INTRAVENOUS at 19:07

## 2018-04-07 RX ADMIN — SODIUM CHLORIDE, POTASSIUM CHLORIDE, SODIUM LACTATE AND CALCIUM CHLORIDE: 600; 310; 30; 20 INJECTION, SOLUTION INTRAVENOUS at 20:31

## 2018-04-07 RX ADMIN — METOCLOPRAMIDE 10 MG: 5 INJECTION, SOLUTION INTRAMUSCULAR; INTRAVENOUS at 19:05

## 2018-04-07 RX ADMIN — VANCOMYCIN HYDROCHLORIDE 1750 MG: 1 INJECTION, POWDER, LYOPHILIZED, FOR SOLUTION INTRAVENOUS at 18:23

## 2018-04-07 RX ADMIN — KETOROLAC TROMETHAMINE 15 MG: 15 INJECTION, SOLUTION INTRAMUSCULAR; INTRAVENOUS at 17:38

## 2018-04-07 RX ADMIN — TAZOBACTAM SODIUM AND PIPERACILLIN SODIUM 4.5 G: 500; 4 INJECTION, SOLUTION INTRAVENOUS at 18:50

## 2018-04-07 RX ADMIN — ACETAMINOPHEN 1000 MG: 500 TABLET, FILM COATED ORAL at 19:54

## 2018-04-07 RX ADMIN — SODIUM CHLORIDE, POTASSIUM CHLORIDE, SODIUM LACTATE AND CALCIUM CHLORIDE 2178 ML: 600; 310; 30; 20 INJECTION, SOLUTION INTRAVENOUS at 17:41

## 2018-04-07 ASSESSMENT — ENCOUNTER SYMPTOMS
FEVER: 1
NAUSEA: 1
SHORTNESS OF BREATH: 1
VOMITING: 1
DIARRHEA: 0
TREMORS: 1
CHILLS: 1

## 2018-04-07 NOTE — IP AVS SNAPSHOT
MRN:[id number]                      After Visit Summary   4/7/2018    Paul Alcantara    MRN: [id number]           Thank you!     Thank you for choosing [facility] for your care. Our goal is always to provide you with excellent care. Hearing back from our patients is one way we can continue to improve our services. Please take a few minutes to complete the written survey that you may receive in the mail after you visit. If you would like to speak to someone directly about your visit please contact Patient Relations at [phone]. Thank you!          Patient Information     Date Of Birth          1985        Designated Caregiver       Most Recent Value    Caregiver    Name of designated caregiver Mita [wife]    Phone number of caregiver [phone] [cell]    Caregiver address home      About your hospital stay     You were admitted on:  April 7, 2018 You last received care in the:  [place] [address] [facility]    You were discharged on:  April 8, 2018        Reason for your hospital stay       You were hospitalized for influenza.                  Who to Call     For medical emergencies, please call 911.  For non-urgent questions about your medical care, please call your primary care provider or clinic, [phone]          Attending Provider     Provider Specialty    Lynn Starr MD Emergency Medicine    Toni Quan,  Internal Medicine       Primary Care Provider Office Phone # Fax #    Bandar Isabella Terry [phone]      After Care Instructions     Activity       Your activity upon discharge: activity as tolerated            Diet       Follow this diet upon discharge: Orders Placed This Encounter      Regular Diet Adult                  Follow-up Appointments     Follow-up and recommended labs and tests        Follow up with your primary care doctor if you are not improving.                  Further instructions from your  "care team       1. Take Tylenol 1000 mg three times daily (no more than every 8 hours).  Take Ibuprofen 600 mg three times daily (can take at the same time as Tylenol).  This will help with your pain and muscle aches related to the flu.  2. You can take Oxycodone for severe pain.    The patient was provided with a limited supply of oral narcotic medication, as it was indicated by their medical condition.  The patient was instructed not to take the pain medication above the prescribed dose and frequency due to the potential for addiction, respiratory depression, and even death. The patient was also instructed not to drive while taking this medication or combine this medication with alcohol.  The patient expressed understanding of these instructions and verbally contracted not to misuse the medication.     Pending Results     Date and Time Order Name Status Description    4/8/2018 0045 Blood culture Preliminary     4/7/2018 1919 Blood culture Preliminary     4/7/2018 1919 Blood culture Preliminary     4/7/2018 1726 CSF Culture Aerobic Bacterial Preliminary     4/7/2018 1726 Urine Culture Aerobic Bacterial Preliminary             Statement of Approval     Ordered          04/08/18 1123  I have reviewed and agree with all the recommendations and orders detailed in this document.  EFFECTIVE NOW     Approved and electronically signed by:  Jackie Chavez MD             Admission Information     Date & Time Provider Department Dept. Phone    4/7/2018 Toni Quan, [place] [address] [facility] [phone]      Your Vitals Were     Blood Pressure Pulse Temperature Respirations Height Weight    103/67 (BP Location: Left arm) 57 97.4  F (36.3  C) (Oral) 20 1.791 m (5' 10.5\") 77 kg (169 lb 12.1 oz)    Pulse Oximetry BMI (Body Mass Index)                98% 24.01 kg/m2          MyChart Information     [employer] lets you send messages to your doctor, view your test results, renew your prescriptions, schedule " "appointments and more. To sign up, go to [email or web address] . Click on \"Log in\" on the left side of the screen, which will take you to the Welcome page. Then click on \"Sign up Now\" on the right side of the page.     You will be asked to enter the access code listed below, as well as some personal information. Please follow the directions to create your username and password.     Your access code is: [email or web address]  Expires: 2018 12:23 PM     Your access code will  in 90 days. If you need help or a new code, please call your [place] clinic or [phone].        Care EveryWhere ID     This is your Care EveryWhere ID. This could be used by other organizations to access your [place] medical records  [id number]        Equal Access to Services     MARCOS MONTES : Katie Cole, anna womack, lisbeth noguera, julio segura . So [facility] [phone].    ATENCIÓN: Si habla español, tiene a machado disposición servicios gratuitos de asistencia lingüística. Llame al [phone].    We comply with applicable federal civil rights laws and [place] laws. We do not discriminate on the basis of race, color, national origin, age, disability, sex, sexual orientation, or gender identity.               Review of your medicines      START taking        Dose / Directions    albuterol 108 (90 BASE) MCG/ACT Inhaler   Commonly known as:  VENTOLIN HFA   Used for:  Influenza B        Dose:  2 puff   Inhale 2 puffs into the lungs every 4 hours as needed for shortness of breath / dyspnea or wheezing   Quantity:  1 Inhaler   Refills:  0       ibuprofen 600 MG tablet   Commonly known as:  ADVIL/MOTRIN   Used for:  Severe sepsis (H)        Dose:  600 mg   Take 1 tablet (600 mg) by mouth 3 times daily as needed for fever or other (mild pain)   Quantity:  60 tablet   Refills:  0       oseltamivir 75 MG capsule   Commonly known as:  TAMIFLU   Indication:  Flu   Used for:  Influenza B "        Dose:  75 mg   Take 1 capsule (75 mg) by mouth 2 times daily for 4 days   Quantity:  8 capsule   Refills:  0       oxyCODONE IR 5 MG tablet   Commonly known as:  ROXICODONE   Used for:  Severe sepsis (H)        Dose:  5 mg   Take 1 tablet (5 mg) by mouth every 8 hours as needed for other (pain control or improvement in physical function. Hold dose for analgesic side effects.)   Quantity:  5 tablet   Refills:  0         CONTINUE these medicines which may have CHANGED, or have new prescriptions. If we are uncertain of the size of tablets/capsules you have at home, strength may be listed as something that might have changed.        Dose / Directions    acetaminophen 500 MG tablet   Commonly known as:  TYLENOL   This may have changed:    - how much to take  - when to take this   Used for:  Episodic tension-type headache, not intractable        Dose:  500-1000 mg   Take 1-2 tablets (500-1,000 mg) by mouth 3 times daily as needed for mild pain   Quantity:  100 tablet   Refills:  3         CONTINUE these medicines which have NOT CHANGED        Dose / Directions    omeprazole 40 MG capsule   Commonly known as:  priLOSEC   Used for:  Gastroesophageal reflux disease without esophagitis        Dose:  40 mg   Take 1 capsule (40 mg) by mouth daily Take 30-60 minutes before a meal.   Quantity:  30 capsule   Refills:  11       vitamin D 2000 UNITS tablet   Used for:  Vitamin D insufficiency        Dose:  2000 Units   Take 2,000 Units by mouth daily   Quantity:  100 tablet   Refills:  11            Where to get your medicines      These medications were sent to [facility] [address] [address] [id number] [facility] [address]     Phone:  [phone]     acetaminophen 500 MG tablet    albuterol 108 (90 BASE) MCG/ACT Inhaler    ibuprofen 600 MG tablet    oseltamivir 75 MG capsule         Some of these will need a paper prescription and others can be bought over the counter.  Ask your nurse if you have questions.     Bring a paper prescription for each of these medications     oxyCODONE IR 5 MG tablet                Protect others around you: Learn how to safely use, store and throw away your medicines at www.disposemymeds.org.        ANTIBIOTIC INSTRUCTION     You've Been Prescribed an Antibiotic - Now What?  Your healthcare team thinks that you or your loved one might have an infection. Some infections can be treated with antibiotics, which are powerful, life-saving drugs. Like all medications, antibiotics have side effects and should only be used when necessary. There are some important things you should know about your antibiotic treatment.      Your healthcare team may run tests before you start taking an antibiotic.    Your team may take samples (e.g., from your blood, urine or other areas) to run tests to look for bacteria. These test can be important to determine if you need an antibiotic at all and, if you do, which antibiotic will work best.      Within a few days, your healthcare team might change or even stop your antibiotic.    Your team may start you on an antibiotic while they are working to find out what is making you sick.    Your team might change your antibiotic because test results show that a different antibiotic would be better to treat your infection.    In some cases, once your team has more information, they learn that you do not need an antibiotic at all. They may find out that you don't have an infection, or that the antibiotic you're taking won't work against your infection. For example, an infection caused by a virus can't be treated with antibiotics. Staying on an antibiotic when you don't need it is more likely to be harmful than helpful.      You may experience side effects from your antibiotic.    Like all medications, antibiotics have side effects. Some of these can be serious.    Let you healthcare team know if you have any known allergies when you are  admitted to the hospital.    One significant side effect of nearly all antibiotics is the risk of severe and sometimes deadly diarrhea caused by Clostridium difficile (C. Difficile). This occurs when a person takes antibiotics because some good germs are destroyed. Antibiotic use allows C. diificile to take over, putting patients at high risk for this serious infection.    As a patient or caregiver, it is important to understand your or your loved one's antibiotic treatment. It is especially important for caregivers to speak up when patients can't speak for themselves. Here are some important questions to ask your healthcare team.    What infection is this antibiotic treating and how do you know I have that infection?    What side effects might occur from this antibiotic?    How long will I need to take this antibiotic?    Is it safe to take this antibiotic with other medications or supplements (e.g., vitamins) that I am taking?     Are there any special directions I need to know about taking this antibiotic? For example, should I take it with food?    How will I be monitored to know whether my infection is responding to the antibiotic?    What tests may help to make sure the right antibiotic is prescribed for me?      Information provided by:  www.cdc.gov/getsmart  U.S. Department of Health and Human Services  Centers for disease Control and Prevention  National Center for Emerging and Zoonotic Infectious Diseases  Division of Healthcare Quality Promotion        Information about OPIOIDS     PRESCRIPTION OPIOIDS: WHAT YOU NEED TO KNOW    Prescription opioids can be used to help relieve moderate to severe pain and are often prescribed following a surgery or injury, or for certain health conditions. These medications can be an important part of treatment but also come with serious risks. It is important to work with your health care provider to make sure you are getting the safest, most effective care.    WHAT ARE  THE RISKS AND SIDE EFFECTS OF OPIOID USE?  Prescription opioids carry serious risks of addiction and overdose, especially with prolonged use. An opioid overdose, often marked by slowed breathing can cause sudden death. The use of prescription opioids can have a number of side effects as well, even when taken as directed:      Tolerance - meaning you might need to take more of a medication for the same pain relief    Physical dependence - meaning you have symptoms of withdrawal when a medication is stopped    Increased sensitivity to pain    Constipation    Nausea, vomiting, and dry mouth    Sleepiness and dizziness    Confusion    Depression    Low levels of testosterone that can result in lower sex drive, energy, and strength    Itching and sweating    RISKS ARE GREATER WITH:    History of drug misuse, substance use disorder, or overdose    Mental health conditions (such as depression or anxiety)    Sleep apnea    Older age (65 years or older)    Pregnancy    Avoid alcohol while taking prescription opioids.   Also, unless specifically advised by your health care provider, medications to avoid include:    Benzodiazepines (such as Xanax or Valium)    Muscle relaxants (such as Soma or Flexeril)    Hypnotics (such as Ambien or Lunesta)    Other prescription opioids    KNOW YOUR OPTIONS:  Talk to your health care provider about ways to manage your pain that do not involve prescription opioids. Some of these options may actually work better and have fewer risks and side effects:    Pain relievers such as acetaminophen, ibuprofen, and naproxen    Some medications that are also used for depression or seizures    Physical therapy and exercise    Cognitive behavioral therapy, a psychological, goal-directed approach, in which patients learn how to modify physical, behavioral, and emotional triggers of pain and stress    IF YOU ARE PRESCRIBED OPIOIDS FOR PAIN:    Never take opioids in greater amounts or more often than  prescribed    Follow up with your primary health care provider and work together to create a plan on how to manage your pain.    Talk about ways to help manage your pain that do not involve prescription opioids    Talk about all concerns and side effects    Help prevent misuse and abuse    Never sell or share prescription opioids    Never use another person's prescription opioids    Store prescription opioids in a secure place and out of reach of others (this may include visitors, children, friends, and family)    Visit www.cdc.gov/drugoverdose to learn about risks of opioid abuse and overdose    If you believe you may be struggling with addiction, tell your health care provider and ask for guidance or call [facility]'s National Helpline at 4-780-871-HELP    LEARN MORE / www.cdc.gov/drugoverdose/prescribing/guideline.html    Safely dispose of unused prescription opioids: Find your local drug take-back programs and more information about the importance of safe disposal at www.doseofreality.[place].gov             Medication List: This is a list of all your medications and when to take them. Check marks below indicate your daily home schedule. Keep this list as a reference.      Medications           Morning Afternoon Evening Bedtime As Needed    acetaminophen 500 MG tablet   Commonly known as:  TYLENOL   Take 1-2 tablets (500-1,000 mg) by mouth 3 times daily as needed for mild pain   Last time this was given:  1,000 mg on 4/8/2018  5:20 AM   Next Dose Due:  Take at 1 pm today and again around 9pm                                        albuterol 108 (90 BASE) MCG/ACT Inhaler   Commonly known as:  VENTOLIN HFA   Inhale 2 puffs into the lungs every 4 hours as needed for shortness of breath / dyspnea or wheezing                                ibuprofen 600 MG tablet   Commonly known as:  ADVIL/MOTRIN   Take 1 tablet (600 mg) by mouth 3 times daily as needed for fever or other (mild pain)                                omeprazole  40 MG capsule   Commonly known as:  priLOSEC   Take 1 capsule (40 mg) by mouth daily Take 30-60 minutes before a meal.   Next Dose Due:  Take tomorrow morning 4/9/18                                   oseltamivir 75 MG capsule   Commonly known as:  TAMIFLU   Take 1 capsule (75 mg) by mouth 2 times daily for 4 days   Last time this was given:  75 mg on 4/8/2018  9:48 AM   Next Dose Due:  Take this evening 4/8/18                                       oxyCODONE IR 5 MG tablet   Commonly known as:  ROXICODONE   Take 1 tablet (5 mg) by mouth every 8 hours as needed for other (pain control or improvement in physical function. Hold dose for analgesic side effects.)   Last time this was given:  10 mg on 4/8/2018  5:13 AM                                vitamin D 2000 UNITS tablet   Take 2,000 Units by mouth daily   Next Dose Due:  Resume as previously prescribed

## 2018-04-07 NOTE — MR AVS SNAPSHOT
"              After Visit Summary   2018    Paul Alcantara    MRN: [id number]           Patient Information     Date Of Birth          1985        Visit Information        Provider Department      2018 4:10 PM Tia Santoyo APRN CNP [facility] [place] [facility]        Today's Diagnoses     Fever and chills    -  1       Follow-ups after your visit        Follow-up notes from your care team     Return if symptoms worsen or fail to improve, for Proceed to ED.      Who to contact     If you have questions or need follow up information about today's clinic visit or your schedule please contact [facility] directly at [phone].  Normal or non-critical lab and imaging results will be communicated to you by MyChart, letter or phone within 4 business days after the clinic has received the results. If you do not hear from us within 7 days, please contact the clinic through MyChart or phone. If you have a critical or abnormal lab result, we will notify you by phone as soon as possible.  Submit refill requests through [employer] or call your pharmacy and they will forward the refill request to us. Please allow 3 business days for your refill to be completed.          Additional Information About Your Visit        MyChart Information     [employer] lets you send messages to your doctor, view your test results, renew your prescriptions, schedule appointments and more. To sign up, go to [email or web address] . Click on \"Log in\" on the left side of the screen, which will take you to the Welcome page. Then click on \"Sign up Now\" on the right side of the page.     You will be asked to enter the access code listed below, as well as some personal information. Please follow the directions to create your username and password.     Your access code is: [email or web address]  Expires: 2018 12:23 PM     Your access code will  in 90 days. If you need help or a new code, " please call your [place] clinic or [phone].        Care EveryWhere ID     This is your Care EveryWhere ID. This could be used by other organizations to access your [facility] medical records  [id number]        Your Vitals Were     Pulse Temperature Respirations Pulse Oximetry          131 103.1  F (39.5  C) (Oral) 18 94%         Blood Pressure from Last 3 Encounters:   04/07/18 (!) 89/54   02/19/18 132/84   01/09/18 98/64    Weight from Last 3 Encounters:   01/09/18 164 lb (74.4 kg)   02/14/17 162 lb 12.8 oz (73.8 kg)   11/27/15 158 lb 12.8 oz (72 kg)              Today, you had the following     No orders found for display       Primary Care Provider Office Phone # Fax #    Bandar Terry [phone]       [address] [facility] [address]        Equal Access to Services     [facility]: Hadii aad ku hadasho Soomaali, waaxda luqadaha, qaybta kaalmada adeegyada, waxay idiin hayaan adeeg kharakane la'danelle . So [facility] [phone].    ATENCIÓN: Si habla español, tiene a machado disposición servicios gratuitos de asistencia lingüística. Llame al [phone].    We comply with applicable federal civil rights laws and [place] laws. We do not discriminate on the basis of race, color, national origin, age, disability, sex, sexual orientation, or gender identity.            Thank you!     Thank you for choosing [facility]  for your care. Our goal is always to provide you with excellent care. Hearing back from our patients is one way we can continue to improve our services. Please take a few minutes to complete the written survey that you may receive in the mail after your visit with us. Thank you!             Your Updated Medication List - Protect others around you: Learn how to safely use, store and throw away your medicines at www.disposemymeds.org.          This list is accurate as of 4/7/18  4:44 PM.  Always use your most recent med list.                    Brand Name Dispense Instructions for use Diagnosis    acetaminophen 500 MG tablet    TYLENOL    100 tablet    Take 2 tablets (1,000 mg) by mouth 2 times daily as needed for mild pain    Episodic tension-type headache, not intractable       Capsaicin 0.1 % cream     56 g    Apply topically At Bedtime    Nocturnal leg cramps       clindamycin 1 % lotion    CLEOCIN-T    60 mL    Apply topically 2 times daily    Acne vulgaris       omeprazole 40 MG capsule    priLOSEC    30 capsule    Take 1 capsule (40 mg) by mouth daily Take 30-60 minutes before a meal.    Gastroesophageal reflux disease without esophagitis       ranitidine 150 MG tablet    ZANTAC    60 tablet    Take 1 tablet (150 mg) by mouth 2 times daily    Gastroesophageal reflux disease without esophagitis       UNKNOWN TO PATIENT      Back pain medication .        vitamin D 2000 UNITS tablet     100 tablet    Take 2,000 Units by mouth daily    Vitamin D insufficiency

## 2018-04-07 NOTE — PROGRESS NOTES
SUBJECTIVE: Paul Alcantara is a 33 year old male, seen as a triage patient given vital signs. Presents with wife complaining of flu-like symptoms: fever to 103.1, chills, myalgias, and congestion for one day. Last tylenol OTC at 10am.     OBJECTIVE:  BP (!) 89/54  Pulse 131  Temp 103.1  F (39.5  C) (Oral)  Resp 18  SpO2 94% Positive orthostatic VS  Appears moderately ill but not toxic; temperature as noted in vitals. CV: S1, S2 auscultated with tachycardiac rate and rhythm, no gallops, murmurs or rubs  Lungs: Bilateral lobes clear throughout without wheezes, crackles, or rhonchi noted.     ASSESSMENT:   1. Orthostatic BP  2. Likely influenza    PLAN: Advised that given symptomatic orthostatic BP will need to proceed to ED for further evaluation. Wife to transport in private vehicle to ED. Report called to Leigh Shaffer RN FadumaSara B. Ali, APRN, CNP
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
## 2018-04-07 NOTE — IP AVS SNAPSHOT
[id number] [facility]    [address]    [facility] [address]    Phone:  [phone]    Fax:  [phone]                                       After Visit Summary   4/7/2018    Paul Alcantara    MRN: [id number]           After Visit Summary Signature Page     I have received my discharge instructions, and my questions have been answered. I have discussed any challenges I see with this plan with the nurse or doctor.    ..........................................................................................................................................  Patient/Patient Representative Signature      ..........................................................................................................................................  Patient Representative Print Name and Relationship to Patient    ..................................................               ................................................  Date                                            Time    ..........................................................................................................................................  Reviewed by Signature/Title    ...................................................              ..............................................  Date                                                            Time

## 2018-04-08 VITALS
WEIGHT: 169.75 LBS | OXYGEN SATURATION: 98 % | RESPIRATION RATE: 20 BRPM | HEART RATE: 57 BPM | BODY MASS INDEX: 23.77 KG/M2 | HEIGHT: 71 IN | DIASTOLIC BLOOD PRESSURE: 67 MMHG | TEMPERATURE: 97.4 F | SYSTOLIC BLOOD PRESSURE: 103 MMHG

## 2018-04-08 PROBLEM — R50.9 FEVER: Status: ACTIVE | Noted: 2018-04-08

## 2018-04-08 LAB
ANION GAP SERPL CALCULATED.3IONS-SCNC: 7 MMOL/L (ref 3–14)
APPEARANCE CSF: CLEAR
BACTERIA SPEC CULT: NO GROWTH
BASOPHILS # BLD AUTO: 0 10E9/L (ref 0–0.2)
BASOPHILS NFR BLD AUTO: 0.1 %
BUN SERPL-MCNC: 11 MG/DL (ref 7–30)
CALCIUM SERPL-MCNC: 8.1 MG/DL (ref 8.5–10.1)
CHLORIDE SERPL-SCNC: 106 MMOL/L (ref 94–109)
CO2 SERPL-SCNC: 24 MMOL/L (ref 20–32)
COLOR CSF: COLORLESS
CREAT SERPL-MCNC: 1.1 MG/DL (ref 0.66–1.25)
DIFFERENTIAL METHOD BLD: ABNORMAL
EOSINOPHIL # BLD AUTO: 0 10E9/L (ref 0–0.7)
EOSINOPHIL NFR BLD AUTO: 0 %
ERYTHROCYTE [DISTWIDTH] IN BLOOD BY AUTOMATED COUNT: 12.4 % (ref 10–15)
FLUAV+FLUBV RNA SPEC QL NAA+PROBE: NEGATIVE
FLUAV+FLUBV RNA SPEC QL NAA+PROBE: POSITIVE
GFR SERPL CREATININE-BSD FRML MDRD: 77 ML/MIN/1.7M2
GLUCOSE SERPL-MCNC: 153 MG/DL (ref 70–99)
HCT VFR BLD AUTO: 40.1 % (ref 40–53)
HGB BLD-MCNC: 13.9 G/DL (ref 13.3–17.7)
IMM GRANULOCYTES # BLD: 0 10E9/L (ref 0–0.4)
IMM GRANULOCYTES NFR BLD: 0.4 %
INTERPRETATION ECG - MUSE: NORMAL
LACTATE BLD-SCNC: 1.1 MMOL/L (ref 0.7–2)
LYMPHOCYTES # BLD AUTO: 0.5 10E9/L (ref 0.8–5.3)
LYMPHOCYTES NFR BLD AUTO: 6.7 %
Lab: NORMAL
MCH RBC QN AUTO: 29.6 PG (ref 26.5–33)
MCHC RBC AUTO-ENTMCNC: 34.7 G/DL (ref 31.5–36.5)
MCV RBC AUTO: 85 FL (ref 78–100)
MONOCYTES # BLD AUTO: 0.3 10E9/L (ref 0–1.3)
MONOCYTES NFR BLD AUTO: 3.9 %
NEUTROPHILS # BLD AUTO: 6.9 10E9/L (ref 1.6–8.3)
NEUTROPHILS NFR BLD AUTO: 88.9 %
NRBC # BLD AUTO: 0 10*3/UL
NRBC BLD AUTO-RTO: 0 /100
PLATELET # BLD AUTO: 139 10E9/L (ref 150–450)
POTASSIUM SERPL-SCNC: 3.7 MMOL/L (ref 3.4–5.3)
RBC # BLD AUTO: 4.7 10E12/L (ref 4.4–5.9)
RBC # CSF MANUAL: 0 /UL (ref 0–2)
RSV RNA SPEC NAA+PROBE: NEGATIVE
SODIUM SERPL-SCNC: 137 MMOL/L (ref 133–144)
SPECIMEN SOURCE: ABNORMAL
SPECIMEN SOURCE: NORMAL
TUBE # CSF: 4 #
WBC # BLD AUTO: 7.7 10E9/L (ref 4–11)
WBC # CSF MANUAL: 1 /UL (ref 0–5)

## 2018-04-08 PROCEDURE — 87040 BLOOD CULTURE FOR BACTERIA: CPT | Performed by: PHYSICIAN ASSISTANT

## 2018-04-08 PROCEDURE — 36415 COLL VENOUS BLD VENIPUNCTURE: CPT | Performed by: INTERNAL MEDICINE

## 2018-04-08 PROCEDURE — 12000000 ZZH R&B MED SURG/OB

## 2018-04-08 PROCEDURE — 85025 COMPLETE CBC W/AUTO DIFF WBC: CPT | Performed by: PHYSICIAN ASSISTANT

## 2018-04-08 PROCEDURE — 25000128 H RX IP 250 OP 636: Performed by: INTERNAL MEDICINE

## 2018-04-08 PROCEDURE — 83605 ASSAY OF LACTIC ACID: CPT | Performed by: INTERNAL MEDICINE

## 2018-04-08 PROCEDURE — 25000132 ZZH RX MED GY IP 250 OP 250 PS 637: Performed by: PHYSICIAN ASSISTANT

## 2018-04-08 PROCEDURE — 36415 COLL VENOUS BLD VENIPUNCTURE: CPT | Performed by: PHYSICIAN ASSISTANT

## 2018-04-08 PROCEDURE — 80048 BASIC METABOLIC PNL TOTAL CA: CPT | Performed by: PHYSICIAN ASSISTANT

## 2018-04-08 PROCEDURE — 25000128 H RX IP 250 OP 636: Performed by: PHYSICIAN ASSISTANT

## 2018-04-08 PROCEDURE — 99207 ZZC APP CREDIT; MD BILLING SHARED VISIT: CPT | Performed by: PHYSICIAN ASSISTANT

## 2018-04-08 PROCEDURE — 99239 HOSP IP/OBS DSCHRG MGMT >30: CPT | Performed by: INTERNAL MEDICINE

## 2018-04-08 PROCEDURE — 25000125 ZZHC RX 250: Performed by: PHYSICIAN ASSISTANT

## 2018-04-08 RX ORDER — CEFTRIAXONE 1 G/1
1 INJECTION, POWDER, FOR SOLUTION INTRAMUSCULAR; INTRAVENOUS EVERY 24 HOURS
Status: DISCONTINUED | OUTPATIENT
Start: 2018-04-08 | End: 2018-04-08

## 2018-04-08 RX ORDER — OSELTAMIVIR PHOSPHATE 75 MG/1
75 CAPSULE ORAL 2 TIMES DAILY
Qty: 8 CAPSULE | Refills: 0 | Status: SHIPPED | OUTPATIENT
Start: 2018-04-08 | End: 2018-04-12

## 2018-04-08 RX ORDER — ALBUTEROL SULFATE 5 MG/ML
2.5 SOLUTION RESPIRATORY (INHALATION)
Status: DISCONTINUED | OUTPATIENT
Start: 2018-04-08 | End: 2018-04-08 | Stop reason: HOSPADM

## 2018-04-08 RX ORDER — PROCHLORPERAZINE MALEATE 5 MG
10 TABLET ORAL EVERY 6 HOURS PRN
Status: DISCONTINUED | OUTPATIENT
Start: 2018-04-08 | End: 2018-04-08 | Stop reason: HOSPADM

## 2018-04-08 RX ORDER — ONDANSETRON 4 MG/1
4 TABLET, ORALLY DISINTEGRATING ORAL EVERY 6 HOURS PRN
Status: DISCONTINUED | OUTPATIENT
Start: 2018-04-08 | End: 2018-04-08 | Stop reason: HOSPADM

## 2018-04-08 RX ORDER — DEXAMETHASONE SODIUM PHOSPHATE 4 MG/ML
10 INJECTION, SOLUTION INTRA-ARTICULAR; INTRALESIONAL; INTRAMUSCULAR; INTRAVENOUS; SOFT TISSUE EVERY 6 HOURS
Status: DISCONTINUED | OUTPATIENT
Start: 2018-04-08 | End: 2018-04-08

## 2018-04-08 RX ORDER — BENZONATATE 100 MG/1
100 CAPSULE ORAL 3 TIMES DAILY PRN
Status: DISCONTINUED | OUTPATIENT
Start: 2018-04-08 | End: 2018-04-08 | Stop reason: HOSPADM

## 2018-04-08 RX ORDER — ALBUTEROL SULFATE 90 UG/1
2 AEROSOL, METERED RESPIRATORY (INHALATION) EVERY 4 HOURS PRN
Qty: 1 INHALER | Refills: 0 | Status: SHIPPED | OUTPATIENT
Start: 2018-04-08 | End: 2019-03-04

## 2018-04-08 RX ORDER — OXYCODONE HYDROCHLORIDE 5 MG/1
5-10 TABLET ORAL
Status: DISCONTINUED | OUTPATIENT
Start: 2018-04-08 | End: 2018-04-08 | Stop reason: HOSPADM

## 2018-04-08 RX ORDER — OXYCODONE HYDROCHLORIDE 5 MG/1
5 TABLET ORAL EVERY 8 HOURS PRN
Qty: 5 TABLET | Refills: 0 | Status: SHIPPED | OUTPATIENT
Start: 2018-04-08 | End: 2018-04-17

## 2018-04-08 RX ORDER — HYDROMORPHONE HYDROCHLORIDE 1 MG/ML
0.2 INJECTION, SOLUTION INTRAMUSCULAR; INTRAVENOUS; SUBCUTANEOUS
Status: DISCONTINUED | OUTPATIENT
Start: 2018-04-08 | End: 2018-04-08 | Stop reason: HOSPADM

## 2018-04-08 RX ORDER — IBUPROFEN 600 MG/1
600 TABLET, FILM COATED ORAL EVERY 6 HOURS PRN
Status: DISCONTINUED | OUTPATIENT
Start: 2018-04-08 | End: 2018-04-08 | Stop reason: HOSPADM

## 2018-04-08 RX ORDER — CEFTRIAXONE 2 G/1
2 INJECTION, POWDER, FOR SOLUTION INTRAMUSCULAR; INTRAVENOUS EVERY 12 HOURS
Status: DISCONTINUED | OUTPATIENT
Start: 2018-04-08 | End: 2018-04-08

## 2018-04-08 RX ORDER — AMOXICILLIN 250 MG
1 CAPSULE ORAL 2 TIMES DAILY PRN
Status: DISCONTINUED | OUTPATIENT
Start: 2018-04-08 | End: 2018-04-08 | Stop reason: HOSPADM

## 2018-04-08 RX ORDER — AMOXICILLIN 250 MG
2 CAPSULE ORAL 2 TIMES DAILY PRN
Status: DISCONTINUED | OUTPATIENT
Start: 2018-04-08 | End: 2018-04-08 | Stop reason: HOSPADM

## 2018-04-08 RX ORDER — IPRATROPIUM BROMIDE AND ALBUTEROL SULFATE 2.5; .5 MG/3ML; MG/3ML
3 SOLUTION RESPIRATORY (INHALATION) EVERY 4 HOURS PRN
Status: DISCONTINUED | OUTPATIENT
Start: 2018-04-08 | End: 2018-04-08 | Stop reason: HOSPADM

## 2018-04-08 RX ORDER — OSELTAMIVIR PHOSPHATE 75 MG/1
75 CAPSULE ORAL 2 TIMES DAILY
Status: DISCONTINUED | OUTPATIENT
Start: 2018-04-08 | End: 2018-04-08 | Stop reason: HOSPADM

## 2018-04-08 RX ORDER — ACETAMINOPHEN 500 MG
500-1000 TABLET ORAL EVERY 6 HOURS PRN
Status: DISCONTINUED | OUTPATIENT
Start: 2018-04-08 | End: 2018-04-08 | Stop reason: HOSPADM

## 2018-04-08 RX ORDER — PROCHLORPERAZINE 25 MG
25 SUPPOSITORY, RECTAL RECTAL EVERY 12 HOURS PRN
Status: DISCONTINUED | OUTPATIENT
Start: 2018-04-08 | End: 2018-04-08 | Stop reason: HOSPADM

## 2018-04-08 RX ORDER — ONDANSETRON 2 MG/ML
4 INJECTION INTRAMUSCULAR; INTRAVENOUS EVERY 6 HOURS PRN
Status: DISCONTINUED | OUTPATIENT
Start: 2018-04-08 | End: 2018-04-08 | Stop reason: HOSPADM

## 2018-04-08 RX ORDER — GUAIFENESIN 600 MG/1
600 TABLET, EXTENDED RELEASE ORAL 2 TIMES DAILY
Status: DISCONTINUED | OUTPATIENT
Start: 2018-04-08 | End: 2018-04-08 | Stop reason: HOSPADM

## 2018-04-08 RX ORDER — ACETAMINOPHEN 500 MG
500-1000 TABLET ORAL 3 TIMES DAILY PRN
Qty: 100 TABLET | Refills: 3 | Status: SHIPPED | OUTPATIENT
Start: 2018-04-08 | End: 2019-03-04

## 2018-04-08 RX ORDER — IBUPROFEN 600 MG/1
600 TABLET, FILM COATED ORAL 3 TIMES DAILY PRN
Qty: 60 TABLET | Refills: 0 | Status: SHIPPED | OUTPATIENT
Start: 2018-04-08 | End: 2019-03-04

## 2018-04-08 RX ORDER — POLYETHYLENE GLYCOL 3350 17 G/17G
17 POWDER, FOR SOLUTION ORAL DAILY PRN
Status: DISCONTINUED | OUTPATIENT
Start: 2018-04-08 | End: 2018-04-08 | Stop reason: HOSPADM

## 2018-04-08 RX ORDER — ACETAMINOPHEN 650 MG/1
650 SUPPOSITORY RECTAL EVERY 4 HOURS PRN
Status: DISCONTINUED | OUTPATIENT
Start: 2018-04-08 | End: 2018-04-08 | Stop reason: HOSPADM

## 2018-04-08 RX ORDER — VANCOMYCIN HYDROCHLORIDE 1 G/200ML
1000 INJECTION, SOLUTION INTRAVENOUS EVERY 8 HOURS
Status: DISCONTINUED | OUTPATIENT
Start: 2018-04-08 | End: 2018-04-08

## 2018-04-08 RX ORDER — NALOXONE HYDROCHLORIDE 0.4 MG/ML
.1-.4 INJECTION, SOLUTION INTRAMUSCULAR; INTRAVENOUS; SUBCUTANEOUS
Status: DISCONTINUED | OUTPATIENT
Start: 2018-04-08 | End: 2018-04-08 | Stop reason: HOSPADM

## 2018-04-08 RX ORDER — SODIUM CHLORIDE 9 MG/ML
INJECTION, SOLUTION INTRAVENOUS CONTINUOUS
Status: DISCONTINUED | OUTPATIENT
Start: 2018-04-08 | End: 2018-04-08 | Stop reason: HOSPADM

## 2018-04-08 RX ADMIN — GUAIFENESIN 600 MG: 600 TABLET, EXTENDED RELEASE ORAL at 09:48

## 2018-04-08 RX ADMIN — GUAIFENESIN 600 MG: 600 TABLET, EXTENDED RELEASE ORAL at 02:09

## 2018-04-08 RX ADMIN — CEFTRIAXONE SODIUM 2 G: 2 INJECTION, POWDER, FOR SOLUTION INTRAMUSCULAR; INTRAVENOUS at 02:09

## 2018-04-08 RX ADMIN — SODIUM CHLORIDE: 9 INJECTION, SOLUTION INTRAVENOUS at 01:48

## 2018-04-08 RX ADMIN — VANCOMYCIN HYDROCHLORIDE 1000 MG: 1 INJECTION, SOLUTION INTRAVENOUS at 05:04

## 2018-04-08 RX ADMIN — PANTOPRAZOLE SODIUM 40 MG: 40 INJECTION, POWDER, FOR SOLUTION INTRAVENOUS at 09:50

## 2018-04-08 RX ADMIN — DEXAMETHASONE SODIUM PHOSPHATE 10 MG: 4 INJECTION, SOLUTION INTRAMUSCULAR; INTRAVENOUS at 01:49

## 2018-04-08 RX ADMIN — ACETAMINOPHEN 1000 MG: 500 TABLET, FILM COATED ORAL at 05:20

## 2018-04-08 RX ADMIN — OSELTAMIVIR PHOSPHATE 75 MG: 75 CAPSULE ORAL at 02:09

## 2018-04-08 RX ADMIN — OXYCODONE HYDROCHLORIDE 10 MG: 5 TABLET ORAL at 05:13

## 2018-04-08 RX ADMIN — OSELTAMIVIR PHOSPHATE 75 MG: 75 CAPSULE ORAL at 09:48

## 2018-04-08 ASSESSMENT — ACTIVITIES OF DAILY LIVING (ADL)
ADLS_ACUITY_SCORE: 9
ADLS_ACUITY_SCORE: 11
ADLS_ACUITY_SCORE: 9

## 2018-04-08 NOTE — PLAN OF CARE
Problem: Patient Care Overview  Goal: Plan of Care/Patient Progress Review  Outcome: Adequate for Discharge Date Met: 04/08/18  Discharge education provided to patient and wife.  Handout given on influenza.  All questions were answered and patient verbalized understanding of medications and orders.  Medications filled at [facility] pharmacy and sent with patient. Patient discharging to home  And transportation provided by wife. No further questions at this time.

## 2018-04-08 NOTE — PHARMACY-ADMISSION MEDICATION HISTORY
.Admission medication history interview status for this patient is complete. See [facility] admission navigator for allergy information, prior to admission medications and immunization status.     Medication history interview source(s):Patient  Medication history resources (including written lists, pill bottles, clinic record):None  Primary pharmacy:[facility] on [address]    Changes made to PTA medication list:  Added: none  Deleted: clindamycin and capsaicin cream, ranitidine  Changed: none    Actions taken by pharmacist (provider contacted, etc):None     Additional medication history information:None    Medication reconciliation/reorder completed by provider prior to medication history? No    Do you take OTC medications (eg tylenol, ibuprofen, fish oil, eye/ear drops, etc)? y  (Y/N)        Prior to Admission medications    Medication Sig Last Dose Taking? Auth Provider   Cholecalciferol (VITAMIN D) 2000 UNITS tablet Take 2,000 Units by mouth daily  Yes Bandar Terry MD   acetaminophen (TYLENOL) 500 MG tablet Take 2 tablets (1,000 mg) by mouth 2 times daily as needed for mild pain  Yes Bandar Terry MD   omeprazole (PRILOSEC) 40 MG capsule Take 1 capsule (40 mg) by mouth daily Take 30-60 minutes before a meal.  Yes Bandar Terry MD
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
## 2018-04-08 NOTE — DISCHARGE SUMMARY
[facility]  Discharge Summary  Name: Paul Alcantara    MRN: [id number]  YOB: 1985    Age: 33 year old  Date of Discharge:  4/8/2018  1:06 PM  Date of Admission: 4/7/2018  Primary Care Provider: Bandar Terry  Discharge Physician:  Jackie Chavez MD  Discharging Service:  Hospitalist      Discharge Diagnoses:  1. Sepsis due to Influenza B  2. Hypoxic Respiratory Failure due to Influenza B  3. Myalgias and HAs     Hospital Course:  Paul Alcantara is a 33 year old male with past medical history of latent TB status post 9 month treatment with isoniazid and GERD who was admitted 4/7/18 with nausea, vomiting, weakness and myalgias and was found to have sepsis and hypoxic respiratory failure ultimately due to influenza B.    Patient presented with fever to 103.1, tachycardia, lactic acidosis and hypoxia.  He had evidence of sepsis.  Initially there was no known source of infection.  He did have a chest x-ray which showed no infiltrate.  He had a headache and neck pain and lumbar puncture was performed and patient was pro phylactically started on vancomycin and ceftriaxone for possible meningitis.  His hypoxia resolved with heart specific treatment.  He also had significant tachycardia which improved with hydration.    He was ultimately found to have Influenza B and started on Tamiflu.  His cultures remained negative and antibiotics were stopped once influenza diagnosis was confirmed.  He did have continued myalgias.  Discussed scheduled Tylenol and Ibuprofen.  He was discharged with #5 Oxycodone for his pain.    # Discharge Pain Plan:   - During his hospitalization, Paul experienced pain due to influenza.  The pain plan for discharge was discussed with Paul and his spouse and the plan was created in a collaborative fashion.    - Opioids prescribed on discharge: Oxycodone 5 mg Q8H PRN #5  - Duration of opioids after discharge: Per CDC opioid prescribing guidelines, a 3  "day prescription of opioids was provided.  - Bowel regimen: not needed  - Non-pharmacologic adjuvants: Heat and Ice   - Pharmacological adjuvants: Ibuprofen and Tylenol         Discharge Disposition:  Discharged to home     Allergies:  No Known Allergies     Condition on Discharge:  Discharge condition: Good   Discharge vitals: Blood pressure 103/67, pulse 57, temperature 97.4  F (36.3  C), temperature source Oral, resp. rate 20, height 1.791 m (5' 10.5\"), weight 77 kg (169 lb 12.1 oz), SpO2 98 %.   Code status on discharge: Full Code     History of Illness:  See detailed admission note for full details.    Physical Exam:  Blood pressure 103/67, pulse 57, temperature 97.4  F (36.3  C), temperature source Oral, resp. rate 20, height 1.791 m (5' 10.5\"), weight 77 kg (169 lb 12.1 oz), SpO2 98 %.  Wt Readings from Last 1 Encounters:   04/08/18 77 kg (169 lb 12.1 oz)     General: Alert, awake, no acute distress.  HEENT: Normocephalic, atraumatic, eyes anicteric and without scleral injection, EOMI, MMM.  Cardiac: RRR, normal S1, S2.  No m/g/r. No LE edema.  Pulmonary: Normal chest rise, normal work of breathing.  Lungs CTAB  Abdomen: soft, non-tender, non-distended.  Normoactive BS.  No guarding or rebound tenderness.  Extremities: no deformities.  Warm, well perfused.  Skin: no rashes or lesions noted.  Warm and Dry.  Neuro: No focal deficits noted.  Speech clear.  Coordination and strength grossly normal.  Psych: Appropriate affect.    Procedures other than Imaging:  IVF and IV Antibiotics     Imaging:  Results for orders placed or performed during the hospital encounter of 04/07/18   Chest  XR, 1 view PORTABLE    Narrative    CHEST ONE VIEW PORTABLE   4/7/2018 5:54 PM     HISTORY: Hypoxia. Fever.    COMPARISON: 4/7/2018.      Impression    IMPRESSION: Negative chest. Lungs clear. No lung consolidations or  infiltrates are identified.    NICOLE HAYS MD        Consultations:  Consultations This Hospital Stay "   PHARMACY TO DOSE [facility]  PHARMACY TO DOSE [facility]     Recent Lab Results:    Recent Labs  Lab 04/08/18  0602 04/07/18  1712   WBC 7.7 7.4   HGB 13.9 14.9   HCT 40.1 44.2   MCV 85 86   * 166       Recent Labs  Lab 04/08/18  0602 04/07/18  1712    136   POTASSIUM 3.7 3.4   CHLORIDE 106 102   CO2 24 26   ANIONGAP 7 8   * 116*   BUN 11 11   CR 1.10 1.25   GFRESTIMATED 77 67   GFRESTBLACK >90 81   VENESSA 8.1* 8.7       Recent Labs  Lab 04/08/18  0602 04/07/18  1959 04/07/18  1712   LACT 1.1 1.6 2.7*          Pending Results:    Unresulted Labs Ordered in the Past 30 Days of this Admission     Date and Time Order Name Status Description    4/8/2018 0045 Blood culture Preliminary     4/7/2018 1919 Blood culture Preliminary     4/7/2018 1919 Blood culture Preliminary     4/7/2018 1726 CSF Culture Aerobic Bacterial Preliminary     4/7/2018 1726 Urine Culture Aerobic Bacterial Preliminary            Discharge Instructions and Follow-Up:   Discharge Orders     Reason for your hospital stay   You were hospitalized for influenza.     Follow-up and recommended labs and tests    Follow up with your primary care doctor if you are not improving.     Activity   Your activity upon discharge: activity as tolerated     Full Code     Diet   Follow this diet upon discharge: Orders Placed This Encounter     Regular Diet Adult       Discharge Medications   Current Discharge Medication List      START taking these medications    Details   oxyCODONE IR (ROXICODONE) 5 MG tablet Take 1 tablet (5 mg) by mouth every 8 hours as needed for other (pain control or improvement in physical function. Hold dose for analgesic side effects.)  Qty: 5 tablet, Refills: 0    Associated Diagnoses: Severe sepsis (H)      ibuprofen (ADVIL/MOTRIN) 600 MG tablet Take 1 tablet (600 mg) by mouth 3 times daily as needed for fever or other (mild pain)  Qty: 60 tablet, Refills: 0    Associated Diagnoses: Severe sepsis (H)      oseltamivir (TAMIFLU) 75 MG  capsule Take 1 capsule (75 mg) by mouth 2 times daily for 4 days  Qty: 8 capsule, Refills: 0    Associated Diagnoses: Influenza B      albuterol (VENTOLIN HFA) 108 (90 BASE) MCG/ACT Inhaler Inhale 2 puffs into the lungs every 4 hours as needed for shortness of breath / dyspnea or wheezing  Qty: 1 Inhaler, Refills: 0    Associated Diagnoses: Influenza B         CONTINUE these medications which have CHANGED    Details   acetaminophen (TYLENOL) 500 MG tablet Take 1-2 tablets (500-1,000 mg) by mouth 3 times daily as needed for mild pain  Qty: 100 tablet, Refills: 3    Associated Diagnoses: Episodic tension-type headache, not intractable         CONTINUE these medications which have NOT CHANGED    Details   Cholecalciferol (VITAMIN D) 2000 UNITS tablet Take 2,000 Units by mouth daily  Qty: 100 tablet, Refills: 11    Associated Diagnoses: Vitamin D insufficiency      omeprazole (PRILOSEC) 40 MG capsule Take 1 capsule (40 mg) by mouth daily Take 30-60 minutes before a meal.  Qty: 30 capsule, Refills: 11    Associated Diagnoses: Gastroesophageal reflux disease without esophagitis             Time Spent on this Encounter   I, Jackie Chavez, personally saw the patient today and spent greater than 30 minutes discharging this patient.    Jackie Chavez MD

## 2018-04-08 NOTE — ED NOTES
[facility]  ED Nurse Handoff Report    Paul Alcantara is a 33 year old male   ED Chief complaint: Fever and Vomiting  . ED Diagnosis:   Final diagnoses:   Severe sepsis (H)     Allergies: No Known Allergies    Code Status: Full Code  Activity level - Baseline/Home:  Independent. Activity Level - Current:   Independent. Lift room needed: No. Bariatric: No   Needed: No   Isolation: No. Infection: Not Applicable.     Vital Signs:   Vitals:    04/07/18 1745 04/07/18 1800 04/07/18 1830 04/07/18 1900   BP: 115/62 115/59 117/77 118/66   Resp: 21 14 9 14   Temp:    100.3  F (37.9  C)   TempSrc:    Temporal   SpO2: 100% 100% 100% 100%   Weight:           Cardiac Rhythm:  ,      Pain level: 0-10 Pain Scale: 10  Patient confused: No. Patient Falls Risk: No.   Elimination Status: Has voided   Patient Report - Initial Complaint: Patient arrived to ED complaining of severe headache, shortness of breath, sore throat, sensitivity to light, fevers and nausea/vomiting. Focused Assessment: lungs sounds coarse, O2 sats low 70s on arrival.  Headache, neck pain and stiffness, chills off and on.     Tests Performed: labs, urine, LP. Abnormal Results:   Labs Ordered and Resulted from Time of ED Arrival Up to the Time of Departure from the ED   CBC WITH PLATELETS DIFFERENTIAL - Abnormal; Notable for the following:        Result Value    Absolute Lymphocytes 0.4 (*)     All other components within normal limits   COMPREHENSIVE METABOLIC PANEL - Abnormal; Notable for the following:     Glucose 116 (*)     All other components within normal limits   LACTIC ACID WHOLE BLOOD - Abnormal; Notable for the following:     Lactic Acid 2.7 (*)     All other components within normal limits   GLUCOSE CSF - Abnormal; Notable for the following:     Glucose CSF 77 (*)     All other components within normal limits   BLOOD GAS VENOUS AND OXYHGB   ROUTINE UA WITH MICROSCOPIC   LACTIC ACID   PULSE OXIMETRY NURSING   CARDIAC  CONTINUOUS MONITORING   MEASURE URINE OUTPUT   PATIENT CARE ORDER   URINE CULTURE AEROBIC BACTERIAL   PROTEIN TOTAL CSF   GRAM STAIN   CSF CULTURE AEROBIC BACTERIAL   CELL COUNT WITH DIFFERENTIAL CSF   INFLUENZA A/B ANTIGEN   BLOOD CULTURE   BLOOD CULTURE   INFLUENZA A AND B AND RSV PCR     Treatments provided: vanco, zosyn, Toradol, Tylenol, Reglan, Benadryl, LR bolus, LR drip  Family Comments: Wife home to get food, will be back.  Patient now weaned off of O2 and sating 100%.    OBS brochure/video discussed/provided to patient:  No  ED Medications:   Medications   lactated ringers infusion (not administered)   lidocaine 2 % injection (not administered)   acetaminophen (TYLENOL) tablet 1,000 mg (1,000 mg Oral Given 4/7/18 1954)   lactated ringers BOLUS 2,178 mL (0 mLs Intravenous Stopped 4/7/18 2011)   piperacillin-tazobactam (ZOSYN) intermittent infusion 4.5 g (0 g Intravenous Stopped 4/7/18 1920)   ketorolac (TORADOL) injection 15 mg (15 mg Intravenous Given 4/7/18 1738)   vancomycin (VANCOCIN) 1,750 mg in sodium chloride 0.9 % 500 mL intermittent infusion (1,750 mg Intravenous New Bag 4/7/18 1823)   metoclopramide (REGLAN) injection 10 mg (10 mg Intravenous Given 4/7/18 1905)   diphenhydrAMINE (BENADRYL) injection 12.5 mg (12.5 mg Intravenous Given 4/7/18 1907)     Drips infusing:  No  For the majority of the shift, the patient's behavior Green. Interventions performed were none.     Severe Sepsis OR Septic Shock Diagnosis Present:   Yes    Per the ED Provider, Time Zero for severe sepsis or septic shock is:  1712    3 Hour Severe Sepsis Bundle Completion:  1. Initial Lactic Acid Result:   Recent Labs   Lab Test  04/07/18 1959 04/07/18 1712   LACT  1.6  2.7*     2. Blood Cultures before Antibiotics: Yes  3. Broad Spectrum Antibiotics Administered: Vancomycin and Zosyn     Anti-infectives     None        4. 2,178 ml of IV fluids have been given so far      6 Hour Severe Sepsis Bundle Completion:    1. Repeat  Lactic Acid Level: 1.6  2. Patient currently on Vasopressors =  No      ED Nurse Name/Phone Number: Yenifer Nguyễn,   8:27 PM  RECEIVING UNIT ED HANDOFF REVIEW    Above ED Nurse Handoff Report was reviewed: Yes  Reviewed by: Ema Kelly on April 8, 2018 at 12:14 AM

## 2018-04-08 NOTE — PROGRESS NOTES
Contacted by nursing staff, influenza B positive. Will start Tamiflu. Would hold off on obtaining CT of chest at this time and reassess if needed by morning rounder.     Kirsty Goldsmith PA-C

## 2018-04-08 NOTE — PHARMACY-VANCOMYCIN DOSING SERVICE
Pharmacy Vancomycin Note  Date of Service 2018  Patient's  1985   33 year old, male    Indication: Meningitis  Goal Trough Level: 15-20 mg/L  Day of Therapy: 1  Current Vancomycin regimen:  1750 mg IV in ER    Current estimated CrCl = Estimated Creatinine Clearance: 92.4 mL/min (based on Cr of 1.25).    Creatinine for last 3 days  2018:  5:12 PM Creatinine 1.25 mg/dL    Recent Vancomycin Levels (past 3 days)  No results found for requested labs within last 72 hours.    Vancomycin IV Administrations (past 72 hours)                   vancomycin (VANCOCIN) 1,750 mg in sodium chloride 0.9 % 500 mL intermittent infusion (mg) 1,750 mg New Bag 18 1823                Nephrotoxins and other renal medications (Future)    Start     Dose/Rate Route Frequency Ordered Stop    18 0400  vancomycin (VANCOCIN) 1000 mg in dextrose 5% 200 mL PREMIX      1,000 mg  200 mL/hr over 1 Hours Intravenous EVERY 8 HOURS 18 0106      18 0045  ibuprofen (ADVIL/MOTRIN) tablet 600 mg      600 mg Oral EVERY 6 HOURS PRN 18 0045               Contrast Orders - past 72 hours     None          Plan:  1.  Vancomycin 1g iv q8h  2.  Pharmacy will check trough levels as appropriate in 1-3 Days.    3. Serum creatinine levels will be ordered daily for the first week of therapy and at least twice weekly for subsequent weeks.      Navin Flaherty        .

## 2018-04-08 NOTE — PLAN OF CARE
Problem: Patient Care Overview  Goal: Plan of Care/Patient Progress Review  Outcome: No Change  Temp 102.8, 102.3. Tylenol given. Patient positive for influenza B. Started Tamiflu. This AM patient complaining of headache and lumbar pain. Oxycodone given. Ice applied to head. Patient upset with interrupted sleep.

## 2018-04-08 NOTE — DISCHARGE INSTRUCTIONS
1. Take Tylenol 1000 mg three times daily (no more than every 8 hours).  Take Ibuprofen 600 mg three times daily (can take at the same time as Tylenol).  This will help with your pain and muscle aches related to the flu.  2. You can take Oxycodone for severe pain.    The patient was provided with a limited supply of oral narcotic medication, as it was indicated by their medical condition.  The patient was instructed not to take the pain medication above the prescribed dose and frequency due to the potential for addiction, respiratory depression, and even death. The patient was also instructed not to drive while taking this medication or combine this medication with alcohol.  The patient expressed understanding of these instructions and verbally contracted not to misuse the medication.

## 2018-04-10 ENCOUNTER — HOSPITAL ENCOUNTER (EMERGENCY)
Facility: CLINIC | Age: 33
Discharge: HOME | End: 2018-04-10
Attending: EMERGENCY MEDICINE | Admitting: EMERGENCY MEDICINE
Payer: COMMERCIAL

## 2018-04-10 ENCOUNTER — TELEPHONE (OUTPATIENT)
Dept: FAMILY MEDICINE | Facility: CLINIC | Age: 33
End: 2018-04-10

## 2018-04-10 VITALS
BODY MASS INDEX: 23.48 KG/M2 | SYSTOLIC BLOOD PRESSURE: 123 MMHG | WEIGHT: 166 LBS | RESPIRATION RATE: 16 BRPM | TEMPERATURE: 97.5 F | DIASTOLIC BLOOD PRESSURE: 78 MMHG | HEART RATE: 50 BPM | OXYGEN SATURATION: 99 %

## 2018-04-10 DIAGNOSIS — J11.1 INFLUENZA: ICD-10-CM

## 2018-04-10 PROCEDURE — 25000132 ZZH RX MED GY IP 250 OP 250 PS 637: Performed by: EMERGENCY MEDICINE

## 2018-04-10 PROCEDURE — 96360 HYDRATION IV INFUSION INIT: CPT

## 2018-04-10 PROCEDURE — 96361 HYDRATE IV INFUSION ADD-ON: CPT

## 2018-04-10 PROCEDURE — 99284 EMERGENCY DEPT VISIT MOD MDM: CPT | Mod: 25

## 2018-04-10 PROCEDURE — 25000125 ZZHC RX 250: Performed by: EMERGENCY MEDICINE

## 2018-04-10 PROCEDURE — 25000128 H RX IP 250 OP 636: Performed by: EMERGENCY MEDICINE

## 2018-04-10 RX ORDER — ACETAMINOPHEN 500 MG
1000 TABLET ORAL ONCE
Status: COMPLETED | OUTPATIENT
Start: 2018-04-10 | End: 2018-04-10

## 2018-04-10 RX ORDER — ONDANSETRON 4 MG/1
4 TABLET, ORALLY DISINTEGRATING ORAL EVERY 8 HOURS PRN
Qty: 10 TABLET | Refills: 0 | Status: SHIPPED | OUTPATIENT
Start: 2018-04-10 | End: 2018-04-12

## 2018-04-10 RX ORDER — ONDANSETRON 4 MG/1
4 TABLET, ORALLY DISINTEGRATING ORAL ONCE
Status: COMPLETED | OUTPATIENT
Start: 2018-04-10 | End: 2018-04-10

## 2018-04-10 RX ORDER — IBUPROFEN 600 MG/1
600 TABLET, FILM COATED ORAL ONCE
Status: COMPLETED | OUTPATIENT
Start: 2018-04-10 | End: 2018-04-10

## 2018-04-10 RX ADMIN — IBUPROFEN 600 MG: 600 TABLET ORAL at 19:47

## 2018-04-10 RX ADMIN — ACETAMINOPHEN 1000 MG: 500 TABLET, FILM COATED ORAL at 19:47

## 2018-04-10 RX ADMIN — ONDANSETRON 4 MG: 4 TABLET, ORALLY DISINTEGRATING ORAL at 19:10

## 2018-04-10 RX ADMIN — SODIUM CHLORIDE 1000 ML: 9 INJECTION, SOLUTION INTRAVENOUS at 20:17

## 2018-04-10 ASSESSMENT — ENCOUNTER SYMPTOMS
HEADACHES: 1
VOMITING: 1
NAUSEA: 1

## 2018-04-10 NOTE — ED PROVIDER NOTES
History     Chief Complaint:  Influenza      HPI   Paul Alcantara is a 33 year old male who presents to the emergency department today for evaluation of flu-like symptoms. The patient states that he was hospitalized over the weekend, 04/047-07/08 for nausea, vomiting, fevers, chills, and shortness of breath and had a culture come back positive for Influenza B. He states that despite being discharged 2 days ago, he has continued to experience a headache, nausea, and vomiting which has brought him to the ED this evening. The patient denies being able to keep any food down. The patient denies receiving the flu shot this year.       Allergies:  No Known Drug Allergies     Medications:    Oxycodone  Acetaminophen  Ibuprofen  Tamiflu  Albuterol Inhaler  Vitamin D  Omeprazole      Past Medical History:    Latent TB  Gastroesophageal reflux disease w/o esophagitis  Vitamin D insufficiency  Hyperlipidemia     Past Surgical History:    History reviewed.  No significant past surgical history.     Family History:    History reviewed.  No significant family history.     Social History:  Smoking Status: negative  Smokeless Tobacco: negative  Alcohol Use: negative  Marital Status:   [2]     Review of Systems   Constitutional: Positive for fever.   Respiratory: Negative for cough.    Cardiovascular: Negative for chest pain.   Gastrointestinal: Positive for nausea and vomiting. Negative for abdominal pain.   Neurological: Positive for headaches.   All other systems reviewed and are negative.      Physical Exam     Patient Vitals for the past 24 hrs:   BP Temp Temp src Pulse Heart Rate Resp SpO2 Weight   04/10/18 2015 123/78 - - - 56 16 - -   04/10/18 1855 134/89 97.5  F (36.4  C) Oral 50 - 16 99 % 75.3 kg (166 lb)     Physical Exam  General: Appears well-developed and well-nourished.   Head: No signs of trauma.   Mouth/Throat: Oropharynx is clear and moist.   Eyes: Conjunctivae are normal.   Neck: Normal range of  motion. No cervical adenopathy  CV: Normal rate and regular rhythm.    Resp: Effort normal and breath sounds normal. No respiratory distress.   GI: Soft. There is no tenderness.  No rebound or guarding.  Normal bowel sounds.    MSK: Normal range of motion.   Neuro: The patient is alert and oriented.  Strength in upper/lower extremities normal and symmetrical.   Sensation normal. Speech normal.  GCS 15  Skin: Skin is warm and dry. No rash noted.   Psych: normal mood and affect. behavior is normal.       Emergency Department Course   Interventions:  1910 Zofran 4 mg Oral  1947 Tylenol 1,000 mg Oral   Ibuprofen 600 mg Oral  2017 Normal Saline 1,000 mL IV    Emergency Department Course:  Nursing notes and vitals reviewed. I performed an exam of the patient as documented above.    2005 I reevaluated the patient and provided an update in regards to his ED course.      2021 I reevaluated the patient and provided an update in regards to his ED course.  He had improvement in his symptoms after the above interventions.     Findings and plan explained to the Patient. Patient discharged home with instructions regarding supportive care, medications, and reasons to return. The importance of close follow-up was reviewed.      Impression & Plan    Medical Decision Making:  Palu Alcantara is a 33 year old male who presents due to headache, body aches, fevers, and generally not feeling well.  He actually had been admitted to the hospital a few days ago for an infection of unclear etiology.  He had a complete workup including blood work and a lumbar puncture that was unremarkable.  Influenza PCR eventually came back positive.  Influenza was very consistent with the symptoms so he was ultimately discharged home with Tamiflu.  He actually presented with his wife who developed similar symptoms today and he wanted to be evaluated as well.  The patient is neurologically intact.  He continued to complain of a headache, but was not  positional so do not feel is a post lumbar puncture headache, and he was complaining of a similar headache prior to the lumbar puncture as well per the patient and documentation.  The patient was given IV fluids along with Zofran, Tylenol, and ibuprofen.  He was asking for stronger pain medication, but I do not feel that opiates are indicated for influenza.  I did discuss with him the natural course of influenza that he can take a number of days to improve and resolve.  I did discuss that it may be of benefit for him to stop the Tamiflu as that may be contributing to his nausea.  He was discharged home with Zofran and recommendation to continue with supportive care measures and to follow-up with his primary care doctor.      Diagnosis:    ICD-10-CM    1. Influenza J11.1        Disposition:  Discharged to home.     Discharge Medications:  New Prescriptions    ONDANSETRON (ZOFRAN ODT) 4 MG ODT TAB    Take 1 tablet (4 mg) by mouth every 8 hours as needed     Scribe Disclosure:  I, Milli Rodriguez, am serving as a scribe on 4/10/2018 at 6:58 PM to personally document services performed by Bradley Rapp MD based on my observations and the provider's statements to me.     Milli Rodriguez  4/10/2018    EMERGENCY DEPARTMENT       Bradley Rapp MD  04/11/18 1917

## 2018-04-10 NOTE — ED AVS SNAPSHOT
Emergency Department    [address] [place] [address]    Phone:  [phone]    Fax:  [phone]                                       Paul Alcantara   MRN: [id number]    Department:   Emergency Department   Date of Visit:  4/10/2018           After Visit Summary Signature Page     I have received my discharge instructions, and my questions have been answered. I have discussed any challenges I see with this plan with the nurse or doctor.    ..........................................................................................................................................  Patient/Patient Representative Signature      ..........................................................................................................................................  Patient Representative Print Name and Relationship to Patient    ..................................................               ................................................  Date                                            Time    ..........................................................................................................................................  Reviewed by Signature/Title    ...................................................              ..............................................  Date                                                            Time

## 2018-04-10 NOTE — ED AVS SNAPSHOT
Emergency Department    [address] [facility] [address]    Phone:  [phone]    Fax:  [phone]                                       Paul Alcantara   MRN: [id number]    Department:   Emergency Department   Date of Visit:  4/10/2018           Patient Information     Date Of Birth          1985        Your diagnoses for this visit were:     Influenza        You were seen by Bradley Rapp MD.      Follow-up Information     Follow up with Bandar Terry MD In 3 days.    Specialty:  Family Practice    Contact information:    [address]  [facility] [address] [phone]          Follow up with  Emergency Department.    Specialty:  EMERGENCY MEDICINE    Why:  As needed, If symptoms worsen    Contact information:    [address] [facility] [address] [phone]        Discharge Instructions         Influenza (Adult)    Influenza is also called the flu. It is a viral illness that affects the air passages of your lungs. It is different from the common cold. The flu can easily be passed from one to person to another. It may be spread through the air by coughing and sneezing. Or it can be spread by touching the sick person and then touching your own eyes, nose, or mouth.  The flu starts 1 to 3 days after you are exposed to the flu virus. It may last for 1 to 2 weeks but many people feel tired or fatigued for many weeks afterward. You usually don t need to take antibiotics unless you have a complication. This might be an ear or sinus infection or pneumonia.  Symptoms of the flu may be mild or severe. They can include extreme tiredness (wanting to stay in bed all day), chills, fevers, muscle aches, soreness with eye movement, headache, and a dry, hacking cough.  Home care  Follow these guidelines when caring for yourself at home:    Avoid being around cigarette smoke, whether yours or other people s.    Acetaminophen or ibuprofen will help  ease your fever, muscle aches, and headache. Don t give aspirin to anyone younger than 18 who has the flu. Aspirin can harm the liver.    Nausea and loss of appetite are common with the flu. Eat light meals. Drink 6 to 8 glasses of liquids every day. Good choices are water, sport drinks, soft drinks without caffeine, juices, tea, and soup. Extra fluids will also help loosen secretions in your nose and lungs.    Over-the-counter cold medicines will not make the flu go away faster. But the medicines may help with coughing, sore throat, and congestion in your nose and sinuses. Don t use a decongestant if you have high blood pressure.    Stay home until your fever has been gone for at least 24 hours without using medicine to reduce fever.  Follow-up care  Follow up with your healthcare provider, or as advised, if you are not getting better over the next week.  If you are age 65 or older, talk with your provider about getting a pneumococcal vaccine every 5 years. You should also get this vaccine if you have chronic asthma or COPD. All adults should get a flu vaccine every fall. Ask your provider about this.  When to seek medical advice  Call your healthcare provider right away if any of these occur:    Cough with lots of colored mucus (sputum) or blood in your mucus    Chest pain, shortness of breath, wheezing, or trouble breathing    Severe headache, or face, neck, or ear pain    New rash with fever    Fever of 100.4 F (38 C) or higher, or as directed by your healthcare provider    Confusion, behavior change, or seizure    Severe weakness or dizziness    You get a new fever or cough after getting better for a few days  Date Last Reviewed: 1/1/2017 2000-2017 [employer] [address] [place] [address]. All rights reserved. This information is not intended as a substitute for professional medical care. Always follow your healthcare professional's instructions.          24 Hour Appointment  Hotline       To make an appointment at any [facility], call [phone] ([phone]). If you don't have a family doctor or clinic, we will help you find one. [facility] clinics are conveniently located to serve the needs of you and your family.             Review of your medicines      START taking        Dose / Directions Last dose taken    ondansetron 4 MG ODT tab   Commonly known as:  ZOFRAN ODT   Dose:  4 mg   Quantity:  10 tablet        Take 1 tablet (4 mg) by mouth every 8 hours as needed   Refills:  0          Our records show that you are taking the medicines listed below. If these are incorrect, please call your family doctor or clinic.        Dose / Directions Last dose taken    acetaminophen 500 MG tablet   Commonly known as:  TYLENOL   Dose:  500-1000 mg   Quantity:  100 tablet        Take 1-2 tablets (500-1,000 mg) by mouth 3 times daily as needed for mild pain   Refills:  3        albuterol 108 (90 Base) MCG/ACT Inhaler   Commonly known as:  VENTOLIN HFA   Dose:  2 puff   Quantity:  1 Inhaler        Inhale 2 puffs into the lungs every 4 hours as needed for shortness of breath / dyspnea or wheezing   Refills:  0        ibuprofen 600 MG tablet   Commonly known as:  ADVIL/MOTRIN   Dose:  600 mg   Quantity:  60 tablet        Take 1 tablet (600 mg) by mouth 3 times daily as needed for fever or other (mild pain)   Refills:  0        omeprazole 40 MG capsule   Commonly known as:  priLOSEC   Dose:  40 mg   Quantity:  30 capsule        Take 1 capsule (40 mg) by mouth daily Take 30-60 minutes before a meal.   Refills:  11        oseltamivir 75 MG capsule   Commonly known as:  TAMIFLU   Dose:  75 mg   Indication:  Flu   Quantity:  8 capsule        Take 1 capsule (75 mg) by mouth 2 times daily for 4 days   Refills:  0        oxyCODONE IR 5 MG tablet   Commonly known as:  ROXICODONE   Dose:  5 mg   Quantity:  5 tablet        Take 1 tablet (5 mg) by mouth every 8 hours as needed for other (pain control or  improvement in physical function. Hold dose for analgesic side effects.)   Refills:  0        vitamin D 2000 units tablet   Dose:  2000 Units   Quantity:  100 tablet        Take 2,000 Units by mouth daily   Refills:  11                Prescriptions were sent or printed at these locations (1 Prescription)                   Other Prescriptions                Printed at Department/Unit printer (1 of 1)         ondansetron (ZOFRAN ODT) 4 MG ODT tab                Orders Needing Specimen Collection     None      Pending Results     No orders found from 4/8/2018 to 4/11/2018.            Pending Culture Results     No orders found from 4/8/2018 to 4/11/2018.            Pending Results Instructions     If you had any lab results that were not finalized at the time of your Discharge, you can call the ED Lab Result RN at [phone]. You will be contacted by this team for any positive Lab results or changes in treatment. The nurses are available 7 days a week from 10A to 6:30P.  You can leave a message 24 hours per day and they will return your call.        Test Results From Your Hospital Stay               Clinical Quality Measure: Blood Pressure Screening     Your blood pressure was checked while you were in the emergency department today. The last reading we obtained was  BP: 123/78 . Please read the guidelines below about what these numbers mean and what you should do about them.  If your systolic blood pressure (the top number) is less than 120 and your diastolic blood pressure (the bottom number) is less than 80, then your blood pressure is normal. There is nothing more that you need to do about it.  If your systolic blood pressure (the top number) is 120-139 or your diastolic blood pressure (the bottom number) is 80-89, your blood pressure may be higher than it should be. You should have your blood pressure rechecked within a year by a primary care provider.  If your systolic blood pressure (the top number) is 140 or  "greater or your diastolic blood pressure (the bottom number) is 90 or greater, you may have high blood pressure. High blood pressure is treatable, but if left untreated over time it can put you at risk for heart attack, stroke, or kidney failure. You should have your blood pressure rechecked by a primary care provider within the next 4 weeks.  If your provider in the emergency department today gave you specific instructions to follow-up with your doctor or provider even sooner than that, you should follow that instruction and not wait for up to 4 weeks for your follow-up visit.        Thank you for choosing [facility]       Thank you for choosing [facility] for your care. Our goal is always to provide you with excellent care. Hearing back from our patients is one way we can continue to improve our services. Please take a few minutes to complete the written survey that you may receive in the mail after you visit with us. Thank you!        [facility] Information     [facility] lets you send messages to your doctor, view your test results, renew your prescriptions, schedule appointments and more. To sign up, go to [email or web address] . Click on \"Log in\" on the left side of the screen, which will take you to the Welcome page. Then click on \"Sign up Now\" on the right side of the page.     You will be asked to enter the access code listed below, as well as some personal information. Please follow the directions to create your username and password.     Your access code is: [email or web address]  Expires: 2018  9:22 PM     Your access code will  in 90 days. If you need help or a new code, please call your [facility] clinic or [phone].        Care EveryWhere ID     This is your Care EveryWhere ID. This could be used by other organizations to access your [facility] medical records  [id number]        Equal Access to Services     MARCOS MONTES AH: anna Gardner qaybta kaalmada adeegyada, waxay " tate segura ah. So [facility] [phone].    ATENCIÓN: Si habla español, tiene a machado disposición servicios gratuitos de asistencia lingüística. Llame al [phone].    We comply with applicable federal civil rights laws and [place] laws. We do not discriminate on the basis of race, color, national origin, age, disability, sex, sexual orientation, or gender identity.            After Visit Summary       This is your record. Keep this with you and show to your community pharmacist(s) and doctor(s) at your next visit.

## 2018-04-11 ASSESSMENT — ENCOUNTER SYMPTOMS
ABDOMINAL PAIN: 0
FEVER: 1
COUGH: 0

## 2018-04-11 NOTE — ED NOTES
"Patient placed call light asking for pain medication for headache, offered pt tylenol or ibuprofen. Pt states \"I have been taking those since Friday, they do not help\" MD aware. Per Dr. Rapp the pt can have ibuprofen or tylenol, he was told this again, he then states \"well why am I in the hospital?\" and continued refusing ibuprofn/tylenol stating \"they will not help\"  "

## 2018-04-11 NOTE — DISCHARGE INSTRUCTIONS
Influenza (Adult)    Influenza is also called the flu. It is a viral illness that affects the air passages of your lungs. It is different from the common cold. The flu can easily be passed from one to person to another. It may be spread through the air by coughing and sneezing. Or it can be spread by touching the sick person and then touching your own eyes, nose, or mouth.  The flu starts 1 to 3 days after you are exposed to the flu virus. It may last for 1 to 2 weeks but many people feel tired or fatigued for many weeks afterward. You usually don t need to take antibiotics unless you have a complication. This might be an ear or sinus infection or pneumonia.  Symptoms of the flu may be mild or severe. They can include extreme tiredness (wanting to stay in bed all day), chills, fevers, muscle aches, soreness with eye movement, headache, and a dry, hacking cough.  Home care  Follow these guidelines when caring for yourself at home:    Avoid being around cigarette smoke, whether yours or other people s.    Acetaminophen or ibuprofen will help ease your fever, muscle aches, and headache. Don t give aspirin to anyone younger than 18 who has the flu. Aspirin can harm the liver.    Nausea and loss of appetite are common with the flu. Eat light meals. Drink 6 to 8 glasses of liquids every day. Good choices are water, sport drinks, soft drinks without caffeine, juices, tea, and soup. Extra fluids will also help loosen secretions in your nose and lungs.    Over-the-counter cold medicines will not make the flu go away faster. But the medicines may help with coughing, sore throat, and congestion in your nose and sinuses. Don t use a decongestant if you have high blood pressure.    Stay home until your fever has been gone for at least 24 hours without using medicine to reduce fever.  Follow-up care  Follow up with your healthcare provider, or as advised, if you are not getting better over the next week.  If you are age 65 or  older, talk with your provider about getting a pneumococcal vaccine every 5 years. You should also get this vaccine if you have chronic asthma or COPD. All adults should get a flu vaccine every fall. Ask your provider about this.  When to seek medical advice  Call your healthcare provider right away if any of these occur:    Cough with lots of colored mucus (sputum) or blood in your mucus    Chest pain, shortness of breath, wheezing, or trouble breathing    Severe headache, or face, neck, or ear pain    New rash with fever    Fever of 100.4 F (38 C) or higher, or as directed by your healthcare provider    Confusion, behavior change, or seizure    Severe weakness or dizziness    You get a new fever or cough after getting better for a few days  Date Last Reviewed: 1/1/2017 2000-2017 [employer]. [address] [place] [address], [place] [address]. All rights reserved. This information is not intended as a substitute for professional medical care. Always follow your healthcare professional's instructions.

## 2018-04-12 ENCOUNTER — OFFICE VISIT (OUTPATIENT)
Dept: FAMILY MEDICINE | Facility: CLINIC | Age: 33
End: 2018-04-12
Payer: COMMERCIAL

## 2018-04-12 ENCOUNTER — HOSPITAL ENCOUNTER (OUTPATIENT)
Dept: CT IMAGING | Facility: CLINIC | Age: 33
Discharge: HOME | End: 2018-04-12
Attending: FAMILY MEDICINE | Admitting: FAMILY MEDICINE
Payer: COMMERCIAL

## 2018-04-12 VITALS
HEART RATE: 82 BPM | OXYGEN SATURATION: 98 % | WEIGHT: 159 LBS | TEMPERATURE: 96.2 F | DIASTOLIC BLOOD PRESSURE: 64 MMHG | SYSTOLIC BLOOD PRESSURE: 114 MMHG | BODY MASS INDEX: 22.26 KG/M2 | HEIGHT: 71 IN

## 2018-04-12 DIAGNOSIS — G97.1 REACTION TO SPINAL OR LUMBAR PUNCTURE: ICD-10-CM

## 2018-04-12 DIAGNOSIS — K21.9 GASTROESOPHAGEAL REFLUX DISEASE WITHOUT ESOPHAGITIS: ICD-10-CM

## 2018-04-12 DIAGNOSIS — J10.1 INFLUENZA B: Primary | ICD-10-CM

## 2018-04-12 DIAGNOSIS — R11.2 INTRACTABLE VOMITING WITH NAUSEA, UNSPECIFIED VOMITING TYPE: ICD-10-CM

## 2018-04-12 LAB
BACTERIA SPEC CULT: NO GROWTH
SPECIMEN SOURCE: NORMAL

## 2018-04-12 PROCEDURE — 99214 OFFICE O/P EST MOD 30 MIN: CPT | Performed by: FAMILY MEDICINE

## 2018-04-12 PROCEDURE — 70450 CT HEAD/BRAIN W/O DYE: CPT

## 2018-04-12 RX ORDER — CODEINE/BUTALBITAL/ASA/CAFFEIN 30-50-325
1 CAPSULE ORAL EVERY 4 HOURS PRN
Qty: 28 CAPSULE | Refills: 1 | Status: SHIPPED | OUTPATIENT
Start: 2018-04-12 | End: 2019-03-04

## 2018-04-12 RX ORDER — NICOTINE POLACRILEX 4 MG/1
20 GUM, CHEWING ORAL DAILY
Qty: 30 TABLET | Refills: 11 | Status: SHIPPED | OUTPATIENT
Start: 2018-04-12 | End: 2019-03-04

## 2018-04-12 RX ORDER — ONDANSETRON 4 MG/1
4-8 TABLET, ORALLY DISINTEGRATING ORAL
Qty: 20 TABLET | Refills: 1 | Status: SHIPPED | OUTPATIENT
Start: 2018-04-12 | End: 2018-04-17

## 2018-04-12 RX ORDER — METOCLOPRAMIDE 5 MG/1
5 TABLET ORAL
Qty: 15 TABLET | Refills: 1 | Status: SHIPPED | OUTPATIENT
Start: 2018-04-12 | End: 2019-03-04

## 2018-04-12 NOTE — MR AVS SNAPSHOT
After Visit Summary   4/12/2018    Paul Alcantara    MRN: [id number]           Patient Information     Date Of Birth          1985        Visit Information        Provider Department      4/12/2018 11:30 AM Aminata Reyes MD [facility]        Today's Diagnoses     Influenza B    -  1    Intractable vomiting with nausea, unspecified vomiting type        Reaction to spinal or lumbar puncture        Gastroesophageal reflux disease without esophagitis          Care Instructions      Spinal TAP HEADACHE    INFLUENZA B     NAUSEA AND VOMITING    ZOFRAN NOT MUCH     REGLAN TRIAL INSTEAD     5MG PO FOUR TIMES DAILY     AMINATA REYES JR., MD     (J10.1) Influenza B  (primary encounter diagnosis)    Comment:      Plan: ondansetron (ZOFRAN ODT) 4 MG ODT tab,           metoclopramide (REGLAN) 5 MG tablet                   (R11.2) Intractable vomiting with nausea, unspecified vomiting type    Comment: REGLAN 5MG PO QID VOMITIN    Plan: metoclopramide (REGLAN) 5 MG tablet,           butalbital-aspirin-caffeine-codeine (FIORINAL           WITH CODEINE) per capsule, CT Head w/o           Contrast, RADIOLOGY REFERRAL, CANCELED:           RADIOLOGY REFERRAL                   (G97.1) Reaction to spinal or lumbar puncture    Comment: FIORACET WITH CODEINE FOR HEADACHE AND CAFFEIN E SEVEE HEADCHE    Plan: CT Head w/o Contrast, RADIOLOGY REFERRAL,                           (K21.9) Gastroesophageal reflux disease without esophagitis    Comment:      Plan: omeprazole ( OMEPRAZOLE) 20 MG tablet    EMERGENCY ROOM TOMORROW FOR BLOOD PATCH IF NOT BETTER                                     Follow-ups after your visit        Additional Services     RADIOLOGY REFERRAL       Your provider has referred you to: [facility] RADIOLOGY [phone]  CAT SCAN OF HEAD WITHOUT CONTRAST     Please be aware that coverage of these services is subject to the terms and  "limitations of your health insurance plan.  Call member services at your health plan with any benefit or coverage questions.      Please bring the following to your appointment:    >>   Any x-rays, CTs or MRIs which have been performed.  Contact the facility where they were done to arrange for  prior to your scheduled appointment.    >>   List of current medications   >>   This referral request   >>   Any documents/labs given to you for this referral    Prior authorization is required for MRI/MRA, CT, Dexa Scans and Worker's Compensation cases.                  Follow-up notes from your care team     Return in about 4 years (around 4/12/2022).      Future tests that were ordered for you today     Open Future Orders        Priority Expected Expires Ordered    CT Head w/o Contrast STAT  4/12/2019 4/12/2018            Who to contact     If you have questions or need follow up information about today's clinic visit or your schedule please contact [facility] directly at [phone].  Normal or non-critical lab and imaging results will be communicated to you by [employer], letter or phone within 4 business days after the clinic has received the results. If you do not hear from us within 7 days, please contact the clinic through [employer] or phone. If you have a critical or abnormal lab result, we will notify you by phone as soon as possible.  Submit refill requests through [employer] or call your pharmacy and they will forward the refill request to us. Please allow 3 business days for your refill to be completed.          Additional Information About Your Visit        Q-go Information     Q-go lets you send messages to your doctor, view your test results, renew your prescriptions, schedule appointments and more. To sign up, go to [email or web address] . Click on \"Log in\" on the left side of the screen, which will take you to the Welcome page. Then click on \"Sign up Now\" on the " "right side of the page.     You will be asked to enter the access code listed below, as well as some personal information. Please follow the directions to create your username and password.     Your access code is: [email or web address]  Expires: 2018  9:22 PM     Your access code will  in 90 days. If you need help or a new code, please call your [place] clinic or [phone].        Care EveryWhere ID     This is your Care EveryWhere ID. This could be used by other organizations to access your [place] medical records  [id number]        Your Vitals Were     Pulse Temperature Height Pulse Oximetry BMI (Body Mass Index)       82 96.2  F (35.7  C) 5' 11\" (1.803 m) 98% 22.18 kg/m2        Blood Pressure from Last 3 Encounters:   18 114/64   04/10/18 123/78   18 103/67    Weight from Last 3 Encounters:   18 159 lb (72.1 kg)   04/10/18 166 lb (75.3 kg)   18 169 lb 12.1 oz (77 kg)              We Performed the Following     RADIOLOGY REFERRAL          Today's Medication Changes          These changes are accurate as of 18 12:39 PM.  If you have any questions, ask your nurse or doctor.               Start taking these medicines.        Dose/Directions    butalbital-aspirin-caffeine-codeine per capsule   Commonly known as:  FIORINAL WITH codeine   Used for:  Intractable vomiting with nausea, unspecified vomiting type   Started by:  Bandar Terry MD        Dose:  1 capsule   Take 1 capsule by mouth every 4 hours as needed for headaches   Quantity:  28 capsule   Refills:  1       metoclopramide 5 MG tablet   Commonly known as:  REGLAN   Used for:  Influenza B, Intractable vomiting with nausea, unspecified vomiting type   Started by:  Bandar Terry MD        Dose:  5 mg   Take 1 tablet (5 mg) by mouth 4 times daily (before meals and nightly)   Quantity:  15 tablet   Refills:  1       ondansetron 4 MG ODT tab   Commonly known as:  ZOFRAN ODT   Used for:  Influenza B "   Started by:  Bandar Terry MD        Dose:  4-8 mg   Take 1-2 tablets (4-8 mg) by mouth 3 times daily (before meals)   Quantity:  20 tablet   Refills:  1         These medicines have changed or have updated prescriptions.        Dose/Directions    * omeprazole 40 MG capsule   Commonly known as:  priLOSEC   This may have changed:  Another medication with the same name was added. Make sure you understand how and when to take each.   Used for:  Gastroesophageal reflux disease without esophagitis   Changed by:  Bandar Terry MD        Dose:  40 mg   Take 1 capsule (40 mg) by mouth daily Take 30-60 minutes before a meal.   Quantity:  30 capsule   Refills:  11       * omeprazole 20 MG tablet   Commonly known as:  HM OMEPRAZOLE   This may have changed:  You were already taking a medication with the same name, and this prescription was added. Make sure you understand how and when to take each.   Used for:  Gastroesophageal reflux disease without esophagitis   Changed by:  Bandar Terry MD        Dose:  20 mg   Take 1 tablet (20 mg) by mouth daily   Quantity:  30 tablet   Refills:  11       * Notice:  This list has 2 medication(s) that are the same as other medications prescribed for you. Read the directions carefully, and ask your doctor or other care provider to review them with you.         Where to get your medicines      These medications were sent to [facility]/pharmacy #[id number] [address] [address]     Phone:  [phone]     metoclopramide 5 MG tablet    omeprazole 20 MG tablet    ondansetron 4 MG ODT tab         Some of these will need a paper prescription and others can be bought over the counter.  Ask your nurse if you have questions.     Bring a paper prescription for each of these medications     butalbital-aspirin-caffeine-codeine per capsule                Primary Care Provider Office Phone # Fax #    Bandar Mason  MD Mara [phone]       [address]  [facility] [address]        Equal Access to Services     MARCOS MONTES : Hadshanita aad ku hadelsiemary Aichaanny, doda corinabarbaraha, shaheedta kasukhda chuckie, julio bravo shahabstiven lindsay imelda green. So [facility] [phone].    ATENCIÓN: Si habla español, tiene a machado disposición servicios gratuitos de asistencia lingüística. Llame al [phone].    We comply with applicable federal civil rights laws and [place] laws. We do not discriminate on the basis of race, color, national origin, age, disability, sex, sexual orientation, or gender identity.            Thank you!     Thank you for choosing [facility]  for your care. Our goal is always to provide you with excellent care. Hearing back from our patients is one way we can continue to improve our services. Please take a few minutes to complete the written survey that you may receive in the mail after your visit with us. Thank you!             Your Updated Medication List - Protect others around you: Learn how to safely use, store and throw away your medicines at www.disposemymeds.org.          This list is accurate as of 4/12/18 12:39 PM.  Always use your most recent med list.                   Brand Name Dispense Instructions for use Diagnosis    acetaminophen 500 MG tablet    TYLENOL    100 tablet    Take 1-2 tablets (500-1,000 mg) by mouth 3 times daily as needed for mild pain    Episodic tension-type headache, not intractable       albuterol 108 (90 Base) MCG/ACT Inhaler    VENTOLIN HFA    1 Inhaler    Inhale 2 puffs into the lungs every 4 hours as needed for shortness of breath / dyspnea or wheezing    Influenza B       butalbital-aspirin-caffeine-codeine per capsule    FIORINAL WITH codeine    28 capsule    Take 1 capsule by mouth every 4 hours as needed for headaches    Intractable vomiting with nausea, unspecified vomiting type       ibuprofen 600 MG tablet     ADVIL/MOTRIN    60 tablet    Take 1 tablet (600 mg) by mouth 3 times daily as needed for fever or other (mild pain)    Severe sepsis (H)       metoclopramide 5 MG tablet    REGLAN    15 tablet    Take 1 tablet (5 mg) by mouth 4 times daily (before meals and nightly)    Influenza B, Intractable vomiting with nausea, unspecified vomiting type       * omeprazole 40 MG capsule    priLOSEC    30 capsule    Take 1 capsule (40 mg) by mouth daily Take 30-60 minutes before a meal.    Gastroesophageal reflux disease without esophagitis       * omeprazole 20 MG tablet    HM OMEPRAZOLE    30 tablet    Take 1 tablet (20 mg) by mouth daily    Gastroesophageal reflux disease without esophagitis       ondansetron 4 MG ODT tab    ZOFRAN ODT    20 tablet    Take 1-2 tablets (4-8 mg) by mouth 3 times daily (before meals)    Influenza B       oseltamivir 75 MG capsule    TAMIFLU    8 capsule    Take 1 capsule (75 mg) by mouth 2 times daily for 4 days    Influenza B       oxyCODONE IR 5 MG tablet    ROXICODONE    5 tablet    Take 1 tablet (5 mg) by mouth every 8 hours as needed for other (pain control or improvement in physical function. Hold dose for analgesic side effects.)    Severe sepsis (H)       vitamin D 2000 units tablet     100 tablet    Take 2,000 Units by mouth daily    Vitamin D insufficiency       * Notice:  This list has 2 medication(s) that are the same as other medications prescribed for you. Read the directions carefully, and ask your doctor or other care provider to review them with you.

## 2018-04-12 NOTE — NURSING NOTE
"Chief Complaint   Patient presents with     Hospital F/U       Initial /64 (Cuff Size: Adult Regular)  Pulse 82  Temp 96.2  F (35.7  C)  Ht 5' 11\" (1.803 m)  Wt 159 lb (72.1 kg)  SpO2 98%  BMI 22.18 kg/m2 Estimated body mass index is 22.18 kg/(m^2) as calculated from the following:    Height as of this encounter: 5' 11\" (1.803 m).    Weight as of this encounter: 159 lb (72.1 kg).  Medication Reconciliation: complete   Hortencia Gómez CMA    "

## 2018-04-12 NOTE — PATIENT INSTRUCTIONS
Spinal TAP HEADACHE    INFLUENZA B     NAUSEA AND VOMITING    ZOFRAN NOT MUCH     REGLAN TRIAL INSTEAD     5MG PO FOUR TIMES DAILY     AMINATA REYES JR., MD     (J10.1) Influenza B  (primary encounter diagnosis)    Comment:      Plan: ondansetron (ZOFRAN ODT) 4 MG ODT tab,           metoclopramide (REGLAN) 5 MG tablet                   (R11.2) Intractable vomiting with nausea, unspecified vomiting type    Comment: REGLAN 5MG PO QID VOMITIN    Plan: metoclopramide (REGLAN) 5 MG tablet,           butalbital-aspirin-caffeine-codeine (FIORINAL           WITH CODEINE) per capsule, CT Head w/o           Contrast, RADIOLOGY REFERRAL, CANCELED:           RADIOLOGY REFERRAL                   (G97.1) Reaction to spinal or lumbar puncture    Comment: FIORACET WITH CODEINE FOR HEADACHE AND CAFFEIN E SEVEE HEADCHE    Plan: CT Head w/o Contrast, RADIOLOGY REFERRAL,                           (K21.9) Gastroesophageal reflux disease without esophagitis    Comment:      Plan: omeprazole ( OMEPRAZOLE) 20 MG tablet    EMERGENCY ROOM TOMORROW FOR BLOOD PATCH IF NOT BETTER

## 2018-04-12 NOTE — PROGRESS NOTES
SUBJECTIVE:   Palu Alcantara is a 33 year old male who presents to clinic today for the following health issues:            Hospital Follow-up Visit:    Hospital/Nursing Home/ Rehab Facility: [facility]  Date of Admission: 4/10/2018  Date of Discharge: 4/11/2018  Reason(s) for Admission: headache, flu            Problems taking medications regularly:  None       Medication changes since discharge: None       Problems adhering to non-medication therapy:  None    Summary of hospitalization:  [facility] discharge summary reviewed  Diagnostic Tests/Treatments reviewed.  Follow up needed:  YES LIGHT SENSITIVITY AND HEADACHE  DAY AFTER LUMBAR PUNCTURE   SHORTNESS OF BREATH AND COUGH HAS RESOLVED HE HAS INFLUENZA B  ANOREXIA NAUSEA VOMITING     Other Healthcare Providers Involved in Patient s Care:         None  Update since discharge: improved.     Post Discharge Medication Reconciliation: discharge medications reconciled, continue medications without change.  Plan of care communicated with patient     Coding guidelines for this visit:  Type of Medical   Decision Making Face-to-Face Visit       within 7 Days of discharge Face-to-Face Visit        within 14 days of discharge   Moderate Complexity [id number]   High Complexity 08910 [id number]          (J10.1) Influenza B  (primary encounter diagnosis)  Comment:  NAUSEA AND VOMITING   OFF TAMIFLU   Plan: ondansetron (ZOFRAN ODT) 4 MG ODT tab,         metoclopramide (REGLAN) 5 MG tablet             (R11.2) Intractable vomiting with nausea, unspecified vomiting type  Comment: REGLAN 5MG PO QID VOMITIN  Plan: metoclopramide (REGLAN) 5 MG tablet,         butalbital-aspirin-caffeine-codeine (FIORINAL         WITH CODEINE) per capsule, CT Head w/o         Contrast, RADIOLOGY REFERRAL,  [facility] RADIOLOGY [phone]   CAT SCAN OF HEAD   CALLED with NORMAL REPORT     (G97.1) Reaction to spinal or lumbar puncture  Comment: FIORACET WITH  CODEINE FOR HEADACHE AND CAFFEIN E SEVEE HEADCHE  Plan: CT Head w/o Contrast, RADIOLOGY REFERRAL,           (K21.9) Gastroesophageal reflux disease without esophagitis  Comment:  OMEPRAZOLE 20MG   SIDE EFFECTS BENEFITS AND RISKS DISCUSSED    TREATMENT PROGNOSIS BENEFITS AND RISKS DISCUSSED   MEDICATION RISKS SIDE EFFECTS BENEFITS AND RISKS DISCUSSED   Plan: omeprazole (HM OMEPRAZOLE) 20 MG tablet                 Problem list and histories reviewed & adjusted, as indicated.  Additional history: as documented      Patient Active Problem List   Diagnosis     Gastroesophageal reflux disease without esophagitis     Infertility management     Vitamin D insufficiency     Hyperlipidemia LDL goal <160     Latent tuberculosis     Fever     History reviewed. No pertinent surgical history.    Social History   Substance Use Topics     Smoking status: Never Smoker     Smokeless tobacco: Never Used     Alcohol use No     Family History   Problem Relation Age of Onset     Family History Negative Mother      Family History Negative Father          Current Outpatient Prescriptions   Medication Sig Dispense Refill     ondansetron (ZOFRAN ODT) 4 MG ODT tab Take 1-2 tablets (4-8 mg) by mouth 3 times daily (before meals) 20 tablet 1     metoclopramide (REGLAN) 5 MG tablet Take 1 tablet (5 mg) by mouth 4 times daily (before meals and nightly) 15 tablet 1     butalbital-aspirin-caffeine-codeine (FIORINAL WITH CODEINE) per capsule Take 1 capsule by mouth every 4 hours as needed for headaches 28 capsule 1     omeprazole (HM OMEPRAZOLE) 20 MG tablet Take 1 tablet (20 mg) by mouth daily 30 tablet 11     acetaminophen (TYLENOL) 500 MG tablet Take 1-2 tablets (500-1,000 mg) by mouth 3 times daily as needed for mild pain 100 tablet 3     ibuprofen (ADVIL/MOTRIN) 600 MG tablet Take 1 tablet (600 mg) by mouth 3 times daily as needed for fever or other (mild pain) 60 tablet 0     albuterol (VENTOLIN HFA) 108 (90 BASE) MCG/ACT Inhaler Inhale 2 puffs  into the lungs every 4 hours as needed for shortness of breath / dyspnea or wheezing 1 Inhaler 0     Cholecalciferol (VITAMIN D) 2000 UNITS tablet Take 2,000 Units by mouth daily 100 tablet 11     omeprazole (PRILOSEC) 40 MG capsule Take 1 capsule (40 mg) by mouth daily Take 30-60 minutes before a meal. 30 capsule 11     oxyCODONE IR (ROXICODONE) 5 MG tablet Take 1 tablet (5 mg) by mouth every 8 hours as needed for other (pain control or improvement in physical function. Hold dose for analgesic side effects.) (Patient not taking: Reported on 4/12/2018) 5 tablet 0     oseltamivir (TAMIFLU) 75 MG capsule Take 1 capsule (75 mg) by mouth 2 times daily for 4 days (Patient not taking: Reported on 4/12/2018) 8 capsule 0     No Known Allergies  Recent Labs   Lab Test  04/08/18   0602  04/07/18   1712  02/14/17   0921   11/27/15   1344   A1C   --    --   5.2   --    --    LDL   --    --   134*   --   166*   HDL   --    --   42   --   54   TRIG   --    --   188*   --   68   ALT   --   64  28   --    --    CR  1.10  1.25  1.24   < >   --    GFRESTIMATED  77  67  68   < >   --    GFRESTBLACK  >90  81  82   < >   --    POTASSIUM  3.7  3.4  4.1   < >   --     < > = values in this interval not displayed.      BP Readings from Last 3 Encounters:   04/12/18 114/64   04/10/18 123/78   04/08/18 103/67    Wt Readings from Last 3 Encounters:   04/12/18 159 lb (72.1 kg)   04/10/18 166 lb (75.3 kg)   04/08/18 169 lb 12.1 oz (77 kg)                  Labs reviewed in EPIC    Reviewed and updated as needed this visit by clinical staff       Reviewed and updated as needed this visit by Provider         ROS:   RECENT INFLUENZA  B  with COUGH AND FEVER WHICH HAS RESOLVED   HEADACHE OCCURRING ONE DAY AFTER LUMBAR PUNCTURE   Review Of Systems  Skin: negative  Eyes: negative  Ears/Nose/Throat: RHINITIS AND PHARYNGITIS  Respiratory: IMPROVED SHORTNESS OF BREATH   Cardiovascular: negative  Gastrointestinal: heartburn  Genitourinary:  "negative  Musculoskeletal: MYALGIAS  Neurologic: headaches  Psychiatric: negative  Hematologic/Lymphatic/Immunologic: negative  Endocrine: negative        OBJECTIVE:     /64 (Cuff Size: Adult Regular)  Pulse 82  Temp 96.2  F (35.7  C)  Ht 5' 11\" (1.803 m)  Wt 159 lb (72.1 kg)  SpO2 98%  BMI 22.18 kg/m2  Body mass index is 22.18 kg/(m^2).  GENERAL: healthy, alert and no distress  EYES: Eyes grossly normal to inspection, PERRL and conjunctivae and sclerae normal  HENT: ear canals and TM's normal, nose and mouth without ulcers or lesions  NECK: no adenopathy, no asymmetry, masses, or scars and thyroid normal to palpation  RESP: lungs clear to auscultation - no rales, rhonchi or wheezes  CV: regular rate and rhythm, normal S1 S2, no S3 or S4, no murmur, click or rub, no peripheral edema and peripheral pulses strong  ABDOMEN: soft, nontender, no hepatosplenomegaly, no masses and bowel sounds normal   (male): normal male genitalia without lesions or urethral discharge, no hernia  MS: no gross musculoskeletal defects noted, no edema  SKIN: no suspicious lesions or rashes  BACK: no CVA tenderness, no paralumbar tenderness  PSYCH: mentation appears normal, affect normal/bright  LYMPH: no cervical, supraclavicular, axillary, or inguinal adenopathy  Neurological Examination:  The patient is alert and oriented times four. Has good attention and concentration. Speech is fluent without dysarthria.      Cranial nerves:    CN I deferred.      CN II: Intact and full visual fields to confrontation bilaterally.      CN III, IV, VI: EOM intact. There is no nystagmus. Has conjugated gaze. Intact direct and consensual pupillary light reflexes.      CN V: Intact and symmetrical to facial sensation in the V1 through V3 bilaterally.      CN VII: Intact and symmetrical eyebrow and lid raise and eyelid closure, smiles and frown.      CN VIII: Intact to finger rub bilaterally.      CN IX and X: The palates elevates symmetrical. " The uvula is midline.      CN XII: The tongue protrudes midline with no atrophy or fasciculations.      Motor exam: The patient has a normal bulk and tone throughout. There is no atrophy, fasciculations, clonus, or abnormal movements appreciated.      Strength Exam:  5/5 strength at shoulder abduction, elbow flexion or extension, wrist flexion or extension, finger abduction, , hip flexion and extension, knee flexion and extension, and dorsiflexion and plantarflexion bilaterally.      Sensation is intact to light touch and pinprick throughout. Has good vibration and proprioception sensation at great toes bilaterally.      Reflexes are  symmetrical at biceps, triceps, brachioradialis, patellar, and Achilles.      Negative Babinski with downgoing toes bilaterally.      Coordination reveals finger-nose-finger, rapid alternating movements, finger tapping, and toe tapping with normal speed and accuracy.      Station and gait is normal. There is no ataxia. Can walk on the toes, heels, and tandem walk without difficulty. Has no drift and a negative Romberg    Diagnostic Test Results:  Results for orders placed or performed during the hospital encounter of 04/07/18   Chest  XR, 1 view PORTABLE    Narrative    CHEST ONE VIEW PORTABLE   4/7/2018 5:54 PM     HISTORY: Hypoxia. Fever.    COMPARISON: 4/7/2018.      Impression    IMPRESSION: Negative chest. Lungs clear. No lung consolidations or  infiltrates are identified.    NICOLE HAYS MD   CBC with platelets differential   Result Value Ref Range    WBC 7.4 4.0 - 11.0 10e9/L    RBC Count 5.12 4.4 - 5.9 10e12/L    Hemoglobin 14.9 13.3 - 17.7 g/dL    Hematocrit 44.2 40.0 - 53.0 %    MCV 86 78 - 100 fl    MCH 29.1 26.5 - 33.0 pg    MCHC 33.7 31.5 - 36.5 g/dL    RDW 12.5 10.0 - 15.0 %    Platelet Count 166 150 - 450 10e9/L    Diff Method Automated Method     % Neutrophils 84.2 %    % Lymphocytes 5.6 %    % Monocytes 9.1 %    % Eosinophils 0.5 %    % Basophils 0.1 %    %  Immature Granulocytes 0.5 %    Nucleated RBCs 0 0 /100    Absolute Neutrophil 6.2 1.6 - 8.3 10e9/L    Absolute Lymphocytes 0.4 (L) 0.8 - 5.3 10e9/L    Absolute Monocytes 0.7 0.0 - 1.3 10e9/L    Absolute Eosinophils 0.0 0.0 - 0.7 10e9/L    Absolute Basophils 0.0 0.0 - 0.2 10e9/L    Abs Immature Granulocytes 0.0 0 - 0.4 10e9/L    Absolute Nucleated RBC 0.0    Comprehensive metabolic panel   Result Value Ref Range    Sodium 136 133 - 144 mmol/L    Potassium 3.4 3.4 - 5.3 mmol/L    Chloride 102 94 - 109 mmol/L    Carbon Dioxide 26 20 - 32 mmol/L    Anion Gap 8 3 - 14 mmol/L    Glucose 116 (H) 70 - 99 mg/dL    Urea Nitrogen 11 7 - 30 mg/dL    Creatinine 1.25 0.66 - 1.25 mg/dL    GFR Estimate 67 >60 mL/min/1.7m2    GFR Estimate If Black 81 >60 mL/min/1.7m2    Calcium 8.7 8.5 - 10.1 mg/dL    Bilirubin Total 0.6 0.2 - 1.3 mg/dL    Albumin 3.8 3.4 - 5.0 g/dL    Protein Total 7.8 6.8 - 8.8 g/dL    Alkaline Phosphatase 136 40 - 150 U/L    ALT 64 0 - 70 U/L    AST 44 0 - 45 U/L   Blood gas venous and oxyhgb   Result Value Ref Range    Ph Venous 7.39 7.32 - 7.43 pH    PCO2 Venous 43 40 - 50 mm Hg    PO2 Venous 29 25 - 47 mm Hg    Bicarbonate Venous 26 21 - 28 mmol/L    FIO2 Room Air     Oxyhemoglobin Venous 58 %    Base Excess Venous 1.0 mmol/L   UA with Microscopic   Result Value Ref Range    Color Urine Straw     Appearance Urine Clear     Glucose Urine Negative NEG^Negative mg/dL    Bilirubin Urine Negative NEG^Negative    Ketones Urine Negative NEG^Negative mg/dL    Specific Gravity Urine 1.004 1.003 - 1.035    Blood Urine Negative NEG^Negative    pH Urine 6.0 5.0 - 7.0 pH    Protein Albumin Urine Negative NEG^Negative mg/dL    Urobilinogen mg/dL 0.0 0.0 - 2.0 mg/dL    Nitrite Urine Negative NEG^Negative    Leukocyte Esterase Urine Negative NEG^Negative    Source Midstream Urine     WBC Urine <1 0 - 5 /HPF    RBC Urine 0 0 - 2 /HPF   Lactic acid whole blood   Result Value Ref Range    Lactic Acid 2.7 (H) 0.7 - 2.0 mmol/L    Lactic acid   Result Value Ref Range    Lactic Acid 1.6 0.4 - 2.0 mmol/L   Basic metabolic panel   Result Value Ref Range    Sodium 137 133 - 144 mmol/L    Potassium 3.7 3.4 - 5.3 mmol/L    Chloride 106 94 - 109 mmol/L    Carbon Dioxide 24 20 - 32 mmol/L    Anion Gap 7 3 - 14 mmol/L    Glucose 153 (H) 70 - 99 mg/dL    Urea Nitrogen 11 7 - 30 mg/dL    Creatinine 1.10 0.66 - 1.25 mg/dL    GFR Estimate 77 >60 mL/min/1.7m2    GFR Estimate If Black >90 >60 mL/min/1.7m2    Calcium 8.1 (L) 8.5 - 10.1 mg/dL   CBC with platelets differential   Result Value Ref Range    WBC 7.7 4.0 - 11.0 10e9/L    RBC Count 4.70 4.4 - 5.9 10e12/L    Hemoglobin 13.9 13.3 - 17.7 g/dL    Hematocrit 40.1 40.0 - 53.0 %    MCV 85 78 - 100 fl    MCH 29.6 26.5 - 33.0 pg    MCHC 34.7 31.5 - 36.5 g/dL    RDW 12.4 10.0 - 15.0 %    Platelet Count 139 (L) 150 - 450 10e9/L    Diff Method Automated Method     % Neutrophils 88.9 %    % Lymphocytes 6.7 %    % Monocytes 3.9 %    % Eosinophils 0.0 %    % Basophils 0.1 %    % Immature Granulocytes 0.4 %    Nucleated RBCs 0 0 /100    Absolute Neutrophil 6.9 1.6 - 8.3 10e9/L    Absolute Lymphocytes 0.5 (L) 0.8 - 5.3 10e9/L    Absolute Monocytes 0.3 0.0 - 1.3 10e9/L    Absolute Eosinophils 0.0 0.0 - 0.7 10e9/L    Absolute Basophils 0.0 0.0 - 0.2 10e9/L    Abs Immature Granulocytes 0.0 0 - 0.4 10e9/L    Absolute Nucleated RBC 0.0    Lactic acid level STAT   Result Value Ref Range    Lactic Acid 1.1 0.7 - 2.0 mmol/L   EKG 12 lead   Result Value Ref Range    Interpretation ECG Click View Image link to view waveform and result    Urine Culture Aerobic Bacterial   Result Value Ref Range    Specimen Description Midstream Urine     Special Requests Specimen received in preservative     Culture Micro No growth    Glucose CSF: Tube 2   Result Value Ref Range    Glucose CSF 77 (H) 40 - 70 mg/dL   Protein total CSF: Tube 2   Result Value Ref Range    Protein Total CSF 26 15 - 60 mg/dL   Gram stain   Result Value Ref Range     Specimen Description Cerebrospinal fluid tube 2     Gram Stain No organisms seen     Gram Stain No WBC's seen     Gram Stain       Gram stain and culture performed on concentrated specimen    Gram Stain       Gram stain review consistent with reported results.  Gram stain slide reviewed at the [facility]     CSF Culture Aerobic Bacterial   Result Value Ref Range    Specimen Description Cerebrospinal fluid tube 2     Culture Micro No growth    Cell count with differential CSF: Tube 4   Result Value Ref Range    WBC CSF 1 0 - 5 /uL    RBC CSF 0 0 - 2 /uL    Tube Number 4 #    Color CSF Colorless CLRL^Colorless    Appearance CSF Clear CLER^Clear   Influenza A/B antigen   Result Value Ref Range    Influenza A/B Agn Specimen Nasal     Influenza A Negative NEG^Negative    Influenza B Negative NEG^Negative   Blood culture   Result Value Ref Range    Specimen Description Blood Left Arm     Special Requests Aerobic and anaerobic bottles received     Culture Micro No growth after 5 days    Blood culture   Result Value Ref Range    Specimen Description Blood Right Arm     Special Requests Aerobic and anaerobic bottles received     Culture Micro No growth after 5 days    Influenza A and B and RSV PCR   Result Value Ref Range    Specimen Description Nasal     Influenza A PCR Negative NEG^Negative    Influenza B PCR Positive (A) NEG^Negative    Resp Syncytial Virus Negative NEG^Negative   Blood culture   Result Value Ref Range    Specimen Description Blood Left Hand     Special Requests Aerobic and anaerobic bottles received     Culture Micro No growth after 4 days        ASSESSMENT/PLAN:           ICD-10-CM    1. Influenza B J10.1 ondansetron (ZOFRAN ODT) 4 MG ODT tab     metoclopramide (REGLAN) 5 MG tablet   2. Intractable vomiting with nausea, unspecified vomiting type R11.2 metoclopramide (REGLAN) 5 MG tablet     butalbital-aspirin-caffeine-codeine (FIORINAL WITH CODEINE) per capsule      CT Head w/o Contrast     RADIOLOGY REFERRAL     CANCELED: RADIOLOGY REFERRAL    REGLAN 5MG PO QID VOMITIN   3. Reaction to spinal or lumbar puncture G97.1 CT Head w/o Contrast     RADIOLOGY REFERRAL     CANCELED: RADIOLOGY REFERRAL    FIORACET WITH CODEINE FOR HEADACHE AND CAFFEIN E SEVEE HEADCHE   4. Gastroesophageal reflux disease without esophagitis K21.9 omeprazole (HM OMEPRAZOLE) 20 MG tablet       Patient Instructions     Spinal TAP HEADACHE    INFLUENZA B     NAUSEA AND VOMITING    ZOFRAN NOT MUCH     REGLAN TRIAL INSTEAD     5MG PO FOUR TIMES DAILY     AMINATA REYES JR., MD     (J10.1) Influenza B  (primary encounter diagnosis)    Comment:      Plan: ondansetron (ZOFRAN ODT) 4 MG ODT tab,           metoclopramide (REGLAN) 5 MG tablet                   (R11.2) Intractable vomiting with nausea, unspecified vomiting type    Comment: REGLAN 5MG PO QID VOMITIN    Plan: metoclopramide (REGLAN) 5 MG tablet,           butalbital-aspirin-caffeine-codeine (FIORINAL           WITH CODEINE) per capsule, CT Head w/o           Contrast, RADIOLOGY REFERRAL, CANCELED:           RADIOLOGY REFERRAL                   (G97.1) Reaction to spinal or lumbar puncture    Comment: FIORACET WITH CODEINE FOR HEADACHE AND CAFFEIN E SEVEE HEADCHE    Plan: CT Head w/o Contrast, RADIOLOGY REFERRAL,                           (K21.9) Gastroesophageal reflux disease without esophagitis    Comment:      Plan: omeprazole (HM OMEPRAZOLE) 20 MG tablet    EMERGENCY ROOM TOMORROW FOR BLOOD PATCH IF NOT BETTER                                 AMINATA REYES MD  [facility]

## 2018-04-13 LAB
BACTERIA SPEC CULT: NO GROWTH
BACTERIA SPEC CULT: NO GROWTH
Lab: NORMAL
Lab: NORMAL
SPECIMEN SOURCE: NORMAL
SPECIMEN SOURCE: NORMAL

## 2018-04-14 LAB
BACTERIA SPEC CULT: NO GROWTH
Lab: NORMAL
SPECIMEN SOURCE: NORMAL

## 2018-04-17 ENCOUNTER — OFFICE VISIT (OUTPATIENT)
Dept: FAMILY MEDICINE | Facility: CLINIC | Age: 33
End: 2018-04-17
Payer: COMMERCIAL

## 2018-04-17 VITALS
SYSTOLIC BLOOD PRESSURE: 100 MMHG | OXYGEN SATURATION: 100 % | BODY MASS INDEX: 22.59 KG/M2 | RESPIRATION RATE: 16 BRPM | WEIGHT: 162 LBS | DIASTOLIC BLOOD PRESSURE: 70 MMHG | HEART RATE: 78 BPM

## 2018-04-17 DIAGNOSIS — G97.1 REACTION TO SPINAL OR LUMBAR PUNCTURE: Primary | ICD-10-CM

## 2018-04-17 DIAGNOSIS — K59.03 DRUG-INDUCED CONSTIPATION: ICD-10-CM

## 2018-04-17 PROCEDURE — 99213 OFFICE O/P EST LOW 20 MIN: CPT | Performed by: FAMILY MEDICINE

## 2018-04-17 RX ORDER — AMOXICILLIN 250 MG
1 CAPSULE ORAL 2 TIMES DAILY
Qty: 60 TABLET | Refills: 1 | Status: SHIPPED | OUTPATIENT
Start: 2018-04-17 | End: 2019-03-04

## 2018-04-17 NOTE — PATIENT INSTRUCTIONS
(G97.1) Reaction to spinal or lumbar puncture  (primary encounter diagnosis)  Comment:    Plan:  GIVEN OPTION TO GO IN TO EMERGENCY ROOM FOR  BLOOD PATCH   HE AND SPOUSE PREFER NOT   WILL INCREASE AMOUNT OF CAFFEINE AND PRN FIORACET with CODEING    (K59.03) Drug-induced constipation  Comment:    Plan: senna-docusate (SENOKOT-S;PERICOLACE) 8.6-50 MG        per tablet

## 2018-04-17 NOTE — MR AVS SNAPSHOT
"              After Visit Summary   4/17/2018    Paul Alcantara    MRN: [id number]           Patient Information     Date Of Birth          1985        Visit Information        Provider Department      4/17/2018 9:45 AM Bandar Terry MD [facility]        Today's Diagnoses     Reaction to spinal or lumbar puncture    -  1    Drug-induced constipation          Care Instructions    (G97.1) Reaction to spinal or lumbar puncture  (primary encounter diagnosis)  Comment:    Plan:      (K59.03) Drug-induced constipation  Comment:    Plan: senna-docusate (SENOKOT-S;PERICOLACE) 8.6-50 MG        per tablet                             Follow-ups after your visit        Who to contact     If you have questions or need follow up information about today's clinic visit or your schedule please contact [facility] directly at [phone].  Normal or non-critical lab and imaging results will be communicated to you by MyChart, letter or phone within 4 business days after the clinic has received the results. If you do not hear from us within 7 days, please contact the clinic through Clicksthart or phone. If you have a critical or abnormal lab result, we will notify you by phone as soon as possible.  Submit refill requests through Trochet or call your pharmacy and they will forward the refill request to us. Please allow 3 business days for your refill to be completed.          Additional Information About Your Visit        MyChart Information     Trochet lets you send messages to your doctor, view your test results, renew your prescriptions, schedule appointments and more. To sign up, go to [email or web address] . Click on \"Log in\" on the left side of the screen, which will take you to the Welcome page. Then click on \"Sign up Now\" on the right side of the page.     You will be asked to enter the access code listed below, as well as some personal " information. Please follow the directions to create your username and password.     Your access code is: [email or web address]  Expires: 2018  9:22 PM     Your access code will  in 90 days. If you need help or a new code, please call your [facility] clinic or [phone].        Care EveryWhere ID     This is your Care EveryWhere ID. This could be used by other organizations to access your [facility] medical records  [id number]        Your Vitals Were     Pulse Respirations Pulse Oximetry BMI (Body Mass Index)          78 16 100% 22.59 kg/m2         Blood Pressure from Last 3 Encounters:   18 100/70   18 114/64   04/10/18 123/78    Weight from Last 3 Encounters:   18 162 lb (73.5 kg)   18 159 lb (72.1 kg)   04/10/18 166 lb (75.3 kg)              Today, you had the following     No orders found for display         Today's Medication Changes          These changes are accurate as of 18 10:21 AM.  If you have any questions, ask your nurse or doctor.               Start taking these medicines.        Dose/Directions    senna-docusate 8.6-50 MG per tablet   Commonly known as:  SENOKOT-S;PERICOLACE   Used for:  Drug-induced constipation   Started by:  Bandar Terry MD        Dose:  1 tablet   Take 1 tablet by mouth 2 times daily   Quantity:  60 tablet   Refills:  1         Stop taking these medicines if you haven't already. Please contact your care team if you have questions.     ondansetron 4 MG ODT tab   Commonly known as:  ZOFRAN ODT   Stopped by:  Bandar Terry MD           oxyCODONE IR 5 MG tablet   Commonly known as:  ROXICODONE   Stopped by:  Bandar Terry MD                Where to get your medicines      These medications were sent to [facility]/pharmacy #[id number] - [place], [place] - [address] [address]     Phone:  [phone]     senna-docusate 8.6-50 MG per tablet                Primary Care Provider Office Phone # Fax  #    Bandar Isabella Terry [phone]       [address]  [facility] [address]        Equal Access to Services     MARCOS MONTES : Hadshanita afshin almanzar parrisho Sodeniaali, waaxda luqadaha, qaybta kaalmada chuckie, julio lindsay latamikoshirley green. So [facility] [phone].    ATENCIÓN: Si habla español, tiene a machado disposición servicios gratuitos de asistencia lingüística. Llame al [phone].    We comply with applicable federal civil rights laws and [place] laws. We do not discriminate on the basis of race, color, national origin, age, disability, sex, sexual orientation, or gender identity.            Thank you!     Thank you for choosing [facility]  for your care. Our goal is always to provide you with excellent care. Hearing back from our patients is one way we can continue to improve our services. Please take a few minutes to complete the written survey that you may receive in the mail after your visit with us. Thank you!             Your Updated Medication List - Protect others around you: Learn how to safely use, store and throw away your medicines at www.disposemymeds.org.          This list is accurate as of 4/17/18 10:21 AM.  Always use your most recent med list.                   Brand Name Dispense Instructions for use Diagnosis    acetaminophen 500 MG tablet    TYLENOL    100 tablet    Take 1-2 tablets (500-1,000 mg) by mouth 3 times daily as needed for mild pain    Episodic tension-type headache, not intractable       albuterol 108 (90 Base) MCG/ACT Inhaler    VENTOLIN HFA    1 Inhaler    Inhale 2 puffs into the lungs every 4 hours as needed for shortness of breath / dyspnea or wheezing    Influenza B       butalbital-aspirin-caffeine-codeine per capsule    FIORINAL WITH codeine    28 capsule    Take 1 capsule by mouth every 4 hours as needed for headaches    Intractable vomiting with nausea, unspecified vomiting type       ibuprofen 600 MG  tablet    ADVIL/MOTRIN    60 tablet    Take 1 tablet (600 mg) by mouth 3 times daily as needed for fever or other (mild pain)    Severe sepsis (H)       metoclopramide 5 MG tablet    REGLAN    15 tablet    Take 1 tablet (5 mg) by mouth 4 times daily (before meals and nightly)    Influenza B, Intractable vomiting with nausea, unspecified vomiting type       * omeprazole 40 MG capsule    priLOSEC    30 capsule    Take 1 capsule (40 mg) by mouth daily Take 30-60 minutes before a meal.    Gastroesophageal reflux disease without esophagitis       * omeprazole 20 MG tablet    HM OMEPRAZOLE    30 tablet    Take 1 tablet (20 mg) by mouth daily    Gastroesophageal reflux disease without esophagitis       senna-docusate 8.6-50 MG per tablet    SENOKOT-S;PERICOLACE    60 tablet    Take 1 tablet by mouth 2 times daily    Drug-induced constipation       vitamin D 2000 units tablet     100 tablet    Take 2,000 Units by mouth daily    Vitamin D insufficiency       * Notice:  This list has 2 medication(s) that are the same as other medications prescribed for you. Read the directions carefully, and ask your doctor or other care provider to review them with you.

## 2018-04-17 NOTE — NURSING NOTE
"Chief Complaint   Patient presents with     RECHECK     headache       Initial /70  Pulse 78  Resp 16  Wt 162 lb (73.5 kg)  SpO2 100%  BMI 22.59 kg/m2 Estimated body mass index is 22.59 kg/(m^2) as calculated from the following:    Height as of 4/12/18: 5' 11\" (1.803 m).    Weight as of this encounter: 162 lb (73.5 kg).  Medication Reconciliation: complete   DARSHANA Morales CMA      "

## 2018-04-17 NOTE — PROGRESS NOTES
SUBJECTIVE:   Paul Alcantara is a 33 year old male who presents to clinic today for the following health issues:      Follow up  POSTSPINAL TAP HEADACHE   IMPROVED with MEDICATION   NOT TAKING CAFFEINE AS RECOMMENDED   FIORICET with CODEINE WORKING   SO      Duration: ongoing    Description (location/character/radiation): pt is here to follow up on his headaches.  Pt says he is feeling a little improvement. Still feeling nausea    Intensity:  moderate    Accompanying signs and symptoms: none    History (similar episodes/previous evaluation): None    Precipitating or alleviating factors: None    Therapies tried and outcome: None           Problem list and histories reviewed & adjusted, as indicated.  Additional history: as documented      Patient Active Problem List   Diagnosis     Gastroesophageal reflux disease without esophagitis     Infertility management     Vitamin D insufficiency     Hyperlipidemia LDL goal <160     Latent tuberculosis     Fever     Reaction to spinal or lumbar puncture     History reviewed. No pertinent surgical history.    Social History   Substance Use Topics     Smoking status: Never Smoker     Smokeless tobacco: Never Used     Alcohol use No     Family History   Problem Relation Age of Onset     Family History Negative Mother      Family History Negative Father          Current Outpatient Prescriptions   Medication Sig Dispense Refill     senna-docusate (SENOKOT-S;PERICOLACE) 8.6-50 MG per tablet Take 1 tablet by mouth 2 times daily 60 tablet 1     metoclopramide (REGLAN) 5 MG tablet Take 1 tablet (5 mg) by mouth 4 times daily (before meals and nightly) 15 tablet 1     butalbital-aspirin-caffeine-codeine (FIORINAL WITH CODEINE) per capsule Take 1 capsule by mouth every 4 hours as needed for headaches 28 capsule 1     omeprazole (HM OMEPRAZOLE) 20 MG tablet Take 1 tablet (20 mg) by mouth daily 30 tablet 11     acetaminophen (TYLENOL) 500 MG tablet Take 1-2 tablets (500-1,000 mg)  by mouth 3 times daily as needed for mild pain 100 tablet 3     ibuprofen (ADVIL/MOTRIN) 600 MG tablet Take 1 tablet (600 mg) by mouth 3 times daily as needed for fever or other (mild pain) 60 tablet 0     albuterol (VENTOLIN HFA) 108 (90 BASE) MCG/ACT Inhaler Inhale 2 puffs into the lungs every 4 hours as needed for shortness of breath / dyspnea or wheezing 1 Inhaler 0     Cholecalciferol (VITAMIN D) 2000 UNITS tablet Take 2,000 Units by mouth daily 100 tablet 11     omeprazole (PRILOSEC) 40 MG capsule Take 1 capsule (40 mg) by mouth daily Take 30-60 minutes before a meal. 30 capsule 11     No Known Allergies  Recent Labs   Lab Test  04/08/18   0602  04/07/18   1712  02/14/17   0921   11/27/15   1344   A1C   --    --   5.2   --    --    LDL   --    --   134*   --   166*   HDL   --    --   42   --   54   TRIG   --    --   188*   --   68   ALT   --   64  28   --    --    CR  1.10  1.25  1.24   < >   --    GFRESTIMATED  77  67  68   < >   --    GFRESTBLACK  >90  81  82   < >   --    POTASSIUM  3.7  3.4  4.1   < >   --     < > = values in this interval not displayed.      BP Readings from Last 3 Encounters:   04/17/18 100/70   04/12/18 114/64   04/10/18 123/78    Wt Readings from Last 3 Encounters:   04/17/18 162 lb (73.5 kg)   04/12/18 159 lb (72.1 kg)   04/10/18 166 lb (75.3 kg)                  Labs reviewed in EPIC    Reviewed and updated as needed this visit by clinical staff  Tobacco  Allergies  Meds  Med Hx  Surg Hx  Fam Hx  Soc Hx      Reviewed and updated as needed this visit by Provider         ROS: has Gastroesophageal reflux disease without esophagitis; Infertility management; Vitamin D insufficiency; Hyperlipidemia LDL goal <160; Latent tuberculosis; Fever; and Reaction to spinal or lumbar puncture on his problem list.    Constitutional, HEENT, cardiovascular, pulmonary, gi and gu systems are negative, except as otherwise noted.  IMPROVED INFLUENZA B AND POST SPINAL HEADACHE     OBJECTIVE:     BP  100/70  Pulse 78  Resp 16  Wt 162 lb (73.5 kg)  SpO2 100%  BMI 22.59 kg/m2  Body mass index is 22.59 kg/(m^2).  GENERAL: healthy, alert and no distress  EYES: Eyes grossly normal to inspection, PERRL and conjunctivae and sclerae normal  HENT: ear canals and TM's normal, nose and mouth without ulcers or lesions  NECK: no adenopathy, no asymmetry, masses, or scars and thyroid normal to palpation  RESP: lungs clear to auscultation - no rales, rhonchi or wheezes  CV: regular rate and rhythm, normal S1 S2, no S3 or S4, no murmur, click or rub, no peripheral edema and peripheral pulses strong  ABDOMEN: soft, nontender, no hepatosplenomegaly, no masses and bowel sounds normal  MS: no gross musculoskeletal defects noted, no edema  SKIN: no suspicious lesions or rashes  NEURO: Normal strength and tone, mentation intact and speech normal  PSYCH: mentation appears normal, affect normal/bright  LYMPH: no cervical, supraclavicular, axillary, or inguinal adenopathy   NORMAL NEURO EXAM    Diagnostic Test Results:  Results for orders placed or performed during the hospital encounter of 04/12/18   CT Head w/o Contrast    Narrative    CT SCAN OF THE HEAD WITHOUT CONTRAST   4/12/2018 3:35 PM     HISTORY: Reaction to spinal or lumbar puncture; Intractable vomiting  with nausea, unspecified vomiting type.    TECHNIQUE: Axial images of the head and coronal reformations without  IV contrast material. Radiation dose for this scan was reduced using  automated exposure control, adjustment of the mA and/or kV according  to patient size, or iterative reconstruction technique.    COMPARISON: None.    FINDINGS: The ventricles are normal in size, shape and configuration.  The brain parenchyma and subarachnoid spaces are normal. There is no  evidence of intracranial hemorrhage, mass, acute infarct or anomaly.     The visualized portions of the sinuses and mastoids appear normal.  There is no evidence of trauma.      Impression    IMPRESSION:  Normal CT scan of the head.  Results called to Dr. Reyes.      RANDI ALBRECHT MD       ASSESSMENT/PLAN:           ICD-10-CM    1. Reaction to spinal or lumbar puncture G97.1    2. Drug-induced constipation K59.03 senna-docusate (SENOKOT-S;PERICOLACE) 8.6-50 MG per tablet       Patient Instructions   (G97.1) Reaction to spinal or lumbar puncture  (primary encounter diagnosis)  Comment:    Plan:  GIVEN OPTION TO GO IN TO EMERGENCY ROOM FOR  BLOOD PATCH   HE AND SPOUSE PREFER NOT   WILL INCREASE AMOUNT OF CAFFEINE AND PRN FIORACET with CODEING    (K59.03) Drug-induced constipation  Comment:    Plan: senna-docusate (SENOKOT-S;PERICOLACE) 8.6-50 MG        per tablet                         AMINATA REYES MD  [facility]

## 2019-03-04 ENCOUNTER — ANCILLARY PROCEDURE (OUTPATIENT)
Dept: GENERAL RADIOLOGY | Facility: CLINIC | Age: 34
End: 2019-03-04
Attending: FAMILY MEDICINE

## 2019-03-04 ENCOUNTER — OFFICE VISIT (OUTPATIENT)
Dept: FAMILY MEDICINE | Facility: CLINIC | Age: 34
End: 2019-03-04

## 2019-03-04 VITALS
RESPIRATION RATE: 16 BRPM | TEMPERATURE: 97.1 F | SYSTOLIC BLOOD PRESSURE: 122 MMHG | WEIGHT: 168 LBS | BODY MASS INDEX: 23.52 KG/M2 | OXYGEN SATURATION: 97 % | HEIGHT: 71 IN | DIASTOLIC BLOOD PRESSURE: 72 MMHG | HEART RATE: 74 BPM

## 2019-03-04 DIAGNOSIS — Z23 NEED FOR VACCINATION: ICD-10-CM

## 2019-03-04 DIAGNOSIS — Z22.7 LATENT TUBERCULOSIS: ICD-10-CM

## 2019-03-04 DIAGNOSIS — Z23 NEED FOR PROPHYLACTIC VACCINATION AND INOCULATION AGAINST INFLUENZA: ICD-10-CM

## 2019-03-04 DIAGNOSIS — Z78.9 NO HISTORY OF MEASLES, MUMPS, RUBELLA (MMR) VACCINATION: ICD-10-CM

## 2019-03-04 DIAGNOSIS — G44.209 TENSION HEADACHE: Primary | ICD-10-CM

## 2019-03-04 DIAGNOSIS — Z11.59 NEED FOR HEPATITIS C SCREENING TEST: ICD-10-CM

## 2019-03-04 DIAGNOSIS — Z23 NEED FOR DPT/HIB VACCINATION: ICD-10-CM

## 2019-03-04 DIAGNOSIS — Z11.59 NEED FOR HEPATITIS B SCREENING TEST: ICD-10-CM

## 2019-03-04 DIAGNOSIS — E55.9 VITAMIN D INSUFFICIENCY: ICD-10-CM

## 2019-03-04 DIAGNOSIS — Z23 NEEDS FLU SHOT: ICD-10-CM

## 2019-03-04 LAB
HBV SURFACE AB SERPL IA-ACNC: >1000 M[IU]/ML
HBV SURFACE AG SERPL QL IA: NONREACTIVE
HCV AB SERPL QL IA: NONREACTIVE

## 2019-03-04 PROCEDURE — 99214 OFFICE O/P EST MOD 30 MIN: CPT | Performed by: FAMILY MEDICINE

## 2019-03-04 PROCEDURE — 86787 VARICELLA-ZOSTER ANTIBODY: CPT | Performed by: FAMILY MEDICINE

## 2019-03-04 PROCEDURE — 71045 X-RAY EXAM CHEST 1 VIEW: CPT | Mod: FY

## 2019-03-04 PROCEDURE — 86706 HEP B SURFACE ANTIBODY: CPT | Performed by: FAMILY MEDICINE

## 2019-03-04 PROCEDURE — 90715 TDAP VACCINE 7 YRS/> IM: CPT | Performed by: FAMILY MEDICINE

## 2019-03-04 PROCEDURE — 86762 RUBELLA ANTIBODY: CPT | Performed by: FAMILY MEDICINE

## 2019-03-04 PROCEDURE — 86803 HEPATITIS C AB TEST: CPT | Performed by: FAMILY MEDICINE

## 2019-03-04 PROCEDURE — 90471 IMMUNIZATION ADMIN: CPT | Performed by: FAMILY MEDICINE

## 2019-03-04 PROCEDURE — 86735 MUMPS ANTIBODY: CPT | Performed by: FAMILY MEDICINE

## 2019-03-04 PROCEDURE — 86765 RUBEOLA ANTIBODY: CPT | Performed by: FAMILY MEDICINE

## 2019-03-04 PROCEDURE — 36415 COLL VENOUS BLD VENIPUNCTURE: CPT | Performed by: FAMILY MEDICINE

## 2019-03-04 PROCEDURE — 87340 HEPATITIS B SURFACE AG IA: CPT | Performed by: FAMILY MEDICINE

## 2019-03-04 PROCEDURE — 90682 RIV4 VACC RECOMBINANT DNA IM: CPT | Performed by: FAMILY MEDICINE

## 2019-03-04 PROCEDURE — 90472 IMMUNIZATION ADMIN EACH ADD: CPT | Performed by: FAMILY MEDICINE

## 2019-03-04 RX ORDER — CHOLECALCIFEROL (VITAMIN D3) 50 MCG
2000 TABLET ORAL DAILY
Qty: 100 TABLET | Refills: 11 | Status: SHIPPED | OUTPATIENT
Start: 2019-03-04 | End: 2020-10-30

## 2019-03-04 ASSESSMENT — MIFFLIN-ST. JEOR: SCORE: 1721.23

## 2019-03-04 NOTE — LETTER
[facility]  [address]  [address]  [place] [address] [phone]                                                                                                           Paul Sandhu Ise  [address]   [place] [facility] [place] [facility] [address]    March 5, 2019      Dear Paul,    You are immune to measles mumps rubella and varicella   So you do not need those shots   You are immune to hepatitis B so you do not need that immunization either   You do not have hepatitis C   You had a normal chest x-ray   Personal history of a positive TB Gold test   But you have already been treated for latent TB with 9 months of isoniazid   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   Results for orders placed or performed in visit on 03/04/19   Hepatitis B surface antigen   Result Value Ref Range    Hep B Surface Agn Nonreactive NR^Nonreactive   Hepatitis B Surface Antibody   Result Value Ref Range    Hepatitis B Surface Antibody >1,000.00 (H) <8.00 m[IU]/mL   Hepatitis C antibody   Result Value Ref Range    Hepatitis C Antibody Nonreactive NR^Nonreactive   Rubeola Antibody IgG   Result Value Ref Range    Rubeola (Measles) Antibody IgG 6.5 (H) 0.0 - 0.8 AI   Rubella Antibody IgG Quantitative   Result Value Ref Range    Rubella Antibody IgG Quantitative 83 IU/mL   Mumps Antibody IgG   Result Value Ref Range    Mumps Antibody IgG 3.1 (H) 0.0 - 0.8 AI   Varicella Zoster Virus Antibody IgG   Result Value Ref Range    Varicella Zoster Virus Antibody IgG 3.8 (H) 0.0 - 0.8 AI             Thank you for choosing [facility].  We appreciate the opportunity to serve you and look forward to supporting your healthcare needs in the future.    If you have any questions or concerns, please call me or my staff at [phone].      Sincerely,    Bandar Trery Jr MD

## 2019-03-04 NOTE — PROGRESS NOTES
SUBJECTIVE:   Paul Alcantara is a 34 year old male who presents to clinic today for the following health issues:      Headaches      Duration: 2 weeks    Description  Location: bilateral in the frontal area, bilateral in the occipital area   Character: throbbing pain  Frequency:  daily  Duration:  Long time    Intensity:  Pain goes away only when taking tylenol    Accompanying signs and symptoms:    Precipitating or Alleviating factors:  Nausea/vomiting: at times  Dizziness: usually  Weakness or numbness: no  Visual changes: none  Fever: no   Sinus or URI symptoms no     History  Head trauma: no  Family history of migraines: no  Previous tests for headaches: no  Neurologist evaluations: no  Able to do daily activities when headache present: YES  Wake with headaches: no  Daily pain medication use: YES- tylenol  Any changes in: none    Precipitating or Alleviating factors (light/sound/sleep/caffeine): light    Therapies tried and outcome: Tylenol    Outcome - usually effective  Frequent/daily pain medication use: YES      Musculoskeletal problem/pain      Duration: 1 month    Description  Location: both legs    Intensity:  moderate    Accompanying signs and symptoms: none    History  Previous similar problem: no   Previous evaluation:  none    Precipitating or alleviating factors:  Trauma or overuse: NO  Aggravating factors include: running, praying    Therapies tried and outcome: acetaminophen, helps      Last time I saw this patient had headaches related to a spinal tap    Had a history of gastroesophageal reflux disease and was taking omeprazole and Reglan for the GI distress  He was taking ibuprofen for joint aches and pains  And vitamin D for bone protection  History of usage of Fioricet with codeine for refractory headaches  And Ventolin for asthma symptoms related to colds  Currently is taking Tylenol 500 mg p.o. 4 times daily as needed for headaches    Past history histories have included  gastroesophageal reflux disease and fertility management vitamin D deficiency hyperlipidemia history of latent tuberculosis and he had a reaction to a spinal or lumbar puncture        Problem list and histories reviewed & adjusted, as indicated.  Additional history: as documented      Patient Active Problem List   Diagnosis     Gastroesophageal reflux disease without esophagitis     Infertility management     Vitamin D insufficiency     Hyperlipidemia LDL goal <160     Latent tuberculosis     Fever     Reaction to spinal or lumbar puncture     Tension headache     History reviewed. No pertinent surgical history.    Social History     Tobacco Use     Smoking status: Never Smoker     Smokeless tobacco: Never Used   Substance Use Topics     Alcohol use: No     Family History   Problem Relation Age of Onset     Family History Negative Mother      Family History Negative Father          Current Outpatient Medications   Medication Sig Dispense Refill     vitamin D3 (CHOLECALCIFEROL) 2000 units tablet Take 1 tablet by mouth daily 100 tablet 11     No Known Allergies  Recent Labs   Lab Test 04/08/18  0602 04/07/18  1712 02/14/17  0921  11/27/15  1344   A1C  --   --  5.2  --   --    LDL  --   --  134*  --  166*   HDL  --   --  42  --  54   TRIG  --   --  188*  --  68   ALT  --  64 28  --   --    CR 1.10 1.25 1.24   < >  --    GFRESTIMATED 77 67 68   < >  --    GFRESTBLACK >90 81 82   < >  --    POTASSIUM 3.7 3.4 4.1   < >  --     < > = values in this interval not displayed.      BP Readings from Last 3 Encounters:   03/04/19 122/72   04/17/18 100/70   04/12/18 114/64    Wt Readings from Last 3 Encounters:   03/04/19 76.2 kg (168 lb)   04/17/18 73.5 kg (162 lb)   04/12/18 72.1 kg (159 lb)                  Labs reviewed in EPIC    Reviewed and updated as needed this visit by clinical staff  Allergies  Meds  Problems  Med Hx  Surg Hx       Reviewed and updated as needed this visit by Provider  Allergies  Meds  Problems   "Med Hx  Surg Hx         ROS:has Gastroesophageal reflux disease without esophagitis; Infertility management; Vitamin D insufficiency; Hyperlipidemia LDL goal <160; Latent tuberculosis; Fever; Reaction to spinal or lumbar puncture; and Tension headache on their problem list.  This patient has a history of latent tuberculosis treated with INH in 2017  For 9 months without complication  Needs a chest x-ray today to ensure that he does not have active disease  On my reading this is negative  He was also supposed to have multiple immunizations  States that he had his immunizations in [place]  But he has no record of those immunizations  Therefore I am recommending that we get blood titers to see what he needs as far as treatment for  Prevention of measles mumps rubella and varicella  I also did screening of hepatitis B and C  There is some he had exposure to hepatitis A when he was living in [facility]    Constitutional, HEENT, cardiovascular, pulmonary, gi and gu systems are negative, except as otherwise noted.    OBJECTIVE:     /72   Pulse 74   Temp 97.1  F (36.2  C) (Tympanic)   Resp 16   Ht 1.791 m (5' 10.5\")   Wt 76.2 kg (168 lb)   SpO2 97%   BMI 23.76 kg/m    Body mass index is 23.76 kg/m .  GENERAL: healthy, alert and no distress  EYES: Eyes grossly normal to inspection, PERRL and conjunctivae and sclerae normal  HENT: ear canals and TM's normal, nose and mouth without ulcers or lesions  NECK: no adenopathy, no asymmetry, masses, or scars and thyroid normal to palpation  RESP: lungs clear to auscultation - no rales, rhonchi or wheezes  CV: regular rate and rhythm, normal S1 S2, no S3 or S4, no murmur, click or rub, no peripheral edema and peripheral pulses strong  ABDOMEN: soft, nontender, no hepatosplenomegaly, no masses and bowel sounds normal  MS: no gross musculoskeletal defects noted, no edema  NEURO: Normal strength and tone, mentation intact and speech " normal  PSYCH: mentation appears normal, affect normal/bright    Diagnostic Test Results:  Results for orders placed or performed during the hospital encounter of 04/12/18   CT Head w/o Contrast    Narrative    CT SCAN OF THE HEAD WITHOUT CONTRAST   4/12/2018 3:35 PM     HISTORY: Reaction to spinal or lumbar puncture; Intractable vomiting  with nausea, unspecified vomiting type.    TECHNIQUE: Axial images of the head and coronal reformations without  IV contrast material. Radiation dose for this scan was reduced using  automated exposure control, adjustment of the mA and/or kV according  to patient size, or iterative reconstruction technique.    COMPARISON: None.    FINDINGS: The ventricles are normal in size, shape and configuration.  The brain parenchyma and subarachnoid spaces are normal. There is no  evidence of intracranial hemorrhage, mass, acute infarct or anomaly.     The visualized portions of the sinuses and mastoids appear normal.  There is no evidence of trauma.      Impression    IMPRESSION: Normal CT scan of the head.  Results called to Dr. Terry.      RANDI ALBRECHT MD       ASSESSMENT/PLAN:           ICD-10-CM    1. Tension headache G44.209    2. Need for hepatitis B screening test Z11.59 Hepatitis B surface antigen     Hepatitis B Surface Antibody   3. Need for hepatitis C screening test Z11.59 Hepatitis C antibody   4. Latent tuberculosis R76.11 XR Chest 1 View   5. No history of measles, mumps, rubella (MMR) vaccination Z78.9 Rubeola Antibody IgG     Rubella Antibody IgG Quantitative     Mumps Antibody IgG     Varicella Zoster Virus Antibody IgG   6. Vitamin D insufficiency E55.9 vitamin D3 (CHOLECALCIFEROL) 2000 units tablet   7. Needs flu shot Z23 ADMIN INFLUENZA VIRUS VAC   8. Need for DPT/Hib vaccination Z23    9. Need for vaccination Z23 TDAP VACCINE (ADACEL) [47198.002]     1st  Administration  [26786]   10. Need for prophylactic vaccination and inoculation against influenza Z23 FLU  VACCINE, (RIV4) RECOMBINANT HA  , IM (FluBlok, egg free) [98638]- >18 YRS (FMG recommended  50-64 YRS)     Vaccine Administration, Each Additional [41894]       Patient Instructions   (G44.209) Tension headache  (primary encounter diagnosis)  Comment:    Plan:  fioracet #3    (Z11.59) Need for hepatitis B screening test  Comment:    Plan: Hepatitis B surface antigen, Hepatitis B         Surface Antibody             (Z11.59) Need for hepatitis C screening test  Comment:    Plan: Hepatitis C antibody             (R76.11) Latent tuberculosis  Comment:    Plan: XR Chest 1 View             (Z78.9) No history of measles, mumps, rubella (MMR) vaccination  Comment:    Plan: Rubeola Antibody IgG, Rubella Antibody IgG         Quantitative, Mumps Antibody IgG, Varicella         Zoster Virus Antibody IgG             (E55.9) Vitamin D insufficiency  Comment:     Plan: vitamin D3 (CHOLECALCIFEROL) 2000 units tablet             (Z23) Needs flu shot  Comment:    Plan: ADMIN INFLUENZA VIRUS VAC             (Z23) Need for DPT/Hib vaccination  Comment:    Plan:     tdap vaccine  History of latent TUBERCULOSIS treatment with iNH x 9 months 2017  Chest xray today  Normal   Aminata Reyes Jr., MD   Influenza shot   Tdap shot   May need more immunization     Aminata Reyes Jr., MD           Plan    Awaiting results of zoster virus mumps measles mumps and rubella virus test before considering immunizations    Is given copies of the AFTER VISIT SUMMARY  asked to show his future employer      AMINATA REYES MD  [facility]

## 2019-03-04 NOTE — PROGRESS NOTES

## 2019-03-04 NOTE — LETTER
March 5, 2019      Paul Alcantara  [address]   [facility] [address]        Dear Mr.Abdullahi Alcantara,    We are writing to inform you of your test results.    YOU ARE IMMUNE TO HEPATITIS B   HEPATITIS B ANTIGEN TEST IS NEGATIVE   NORMAL HEPATITIS C ANTIBODY     Resulted Orders   Hepatitis B surface antigen   Result Value Ref Range    Hep B Surface Agn Nonreactive NR^Nonreactive   Hepatitis B Surface Antibody   Result Value Ref Range    Hepatitis B Surface Antibody >1,000.00 (H) <8.00 m[IU]/mL      Comment:      Reactive, Patient is considered to be immune to infection with hepatitis B   when the value is greater than or equal to 12.0 m[IU]/mL.     Hepatitis C antibody   Result Value Ref Range    Hepatitis C Antibody Nonreactive NR^Nonreactive      Comment:      Assay performance characteristics have not been established for newborns,   infants, and children         If you have any questions or concerns, please call the clinic at the number listed above.       Sincerely,        AMINATA REYES MD

## 2019-03-04 NOTE — PROGRESS NOTES
Screening Questionnaire for Adult Immunization    Are you sick today?   No   Do you have allergies to medications, food, a vaccine component or latex?   No   Have you ever had a serious reaction after receiving a vaccination?   No   Do you have a long-term health problem with heart disease, lung disease, asthma, kidney disease, metabolic disease (e.g. diabetes), anemia, or other blood disorder?   No   Do you have cancer, leukemia, HIV/AIDS, or any other immune system problem?   No   In the past 3 months, have you taken medications that affect  your immune system, such as prednisone, other steroids, or anticancer drugs; drugs for the treatment of rheumatoid arthritis, Crohn s disease, or psoriasis; or have you had radiation treatments?   No   Have you had a seizure, or a brain or other nervous system problem?   No   During the past year, have you received a transfusion of blood or blood     products, or been given immune (gamma) globulin or antiviral drug?   No   For women: Are you pregnant or is there a chance you could become        pregnant during the next month?   No   Have you received any vaccinations in the past 4 weeks?   No     Immunization questionnaire answers were all negative.        Per orders of Dr. Terry, injection of Tdap, Flu given by Hortencia Gómez. Patient instructed to remain in clinic for 15 minutes afterwards, and to report any adverse reaction to me immediately.       Screening performed by Hortencia Gómez on 3/4/2019 at 10:41 AM.

## 2019-03-04 NOTE — PATIENT INSTRUCTIONS
(G44.209) Tension headache  (primary encounter diagnosis)  Comment:    Plan:  fioracet #3    (Z11.59) Need for hepatitis B screening test  Comment:    Plan: Hepatitis B surface antigen, Hepatitis B         Surface Antibody             (Z11.59) Need for hepatitis C screening test  Comment:    Plan: Hepatitis C antibody             (R76.11) Latent tuberculosis  Comment:    Plan: XR Chest 1 View             (Z78.9) No history of measles, mumps, rubella (MMR) vaccination  Comment:    Plan: Rubeola Antibody IgG, Rubella Antibody IgG         Quantitative, Mumps Antibody IgG, Varicella         Zoster Virus Antibody IgG             (E55.9) Vitamin D insufficiency  Comment:     Plan: vitamin D3 (CHOLECALCIFEROL) 2000 units tablet             (Z23) Needs flu shot  Comment:    Plan: ADMIN INFLUENZA VIRUS VAC             (Z23) Need for DPT/Hib vaccination  Comment:    Plan:     tdap vaccine  History of latent TUBERCULOSIS treatment with iNH x 9 months 2017  Chest xray today  Normal   Bandar Terry Jr., MD   Influenza shot   Tdap shot   May need more immunization     Bandar Terry Jr., MD

## 2019-03-05 LAB
MEV IGG SER QL IA: 6.5 AI (ref 0–0.8)
MUV IGG SER QL IA: 3.1 AI (ref 0–0.8)
RUBV IGG SERPL IA-ACNC: 83 IU/ML
VZV IGG SER QL IA: 3.8 AI (ref 0–0.8)

## 2019-05-20 ENCOUNTER — ANCILLARY PROCEDURE (OUTPATIENT)
Dept: GENERAL RADIOLOGY | Facility: CLINIC | Age: 34
End: 2019-05-20
Attending: FAMILY MEDICINE
Payer: COMMERCIAL

## 2019-05-20 ENCOUNTER — OFFICE VISIT (OUTPATIENT)
Dept: FAMILY MEDICINE | Facility: CLINIC | Age: 34
End: 2019-05-20
Payer: COMMERCIAL

## 2019-05-20 VITALS
DIASTOLIC BLOOD PRESSURE: 76 MMHG | OXYGEN SATURATION: 97 % | WEIGHT: 164 LBS | TEMPERATURE: 96.8 F | RESPIRATION RATE: 16 BRPM | SYSTOLIC BLOOD PRESSURE: 112 MMHG | BODY MASS INDEX: 23.2 KG/M2 | HEART RATE: 68 BPM
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
## 2019-05-20 DIAGNOSIS — R07.81 RIB PAIN ON LEFT SIDE: ICD-10-CM

## 2019-05-20 DIAGNOSIS — M54.2 NECK PAIN: ICD-10-CM
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
## 2019-05-20 DIAGNOSIS — M54.50 ACUTE LEFT-SIDED LOW BACK PAIN WITHOUT SCIATICA: ICD-10-CM

## 2019-05-20 PROCEDURE — 99214 OFFICE O/P EST MOD 30 MIN: CPT | Performed by: FAMILY MEDICINE

## 2019-05-20 PROCEDURE — 72040 X-RAY EXAM NECK SPINE 2-3 VW: CPT | Mod: FY

## 2019-05-20 PROCEDURE — 72100 X-RAY EXAM L-S SPINE 2/3 VWS: CPT | Mod: FY

## 2019-05-20 PROCEDURE — 71046 X-RAY EXAM CHEST 2 VIEWS: CPT | Mod: FY

## 2019-05-20 RX ORDER — IBUPROFEN 600 MG/1
600 TABLET, FILM COATED ORAL EVERY 6 HOURS PRN
Qty: 120 TABLET | Refills: 0 | Status: SHIPPED | OUTPATIENT
Start: 2019-05-20 | End: 2020-11-05
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
## 2020-01-31 ENCOUNTER — APPOINTMENT (OUTPATIENT)
Dept: CT IMAGING | Facility: CLINIC | Age: 35
End: 2020-01-31
Attending: INTERNAL MEDICINE
Payer: COMMERCIAL

## 2020-01-31 ENCOUNTER — APPOINTMENT (OUTPATIENT)
Dept: GENERAL RADIOLOGY | Facility: CLINIC | Age: 35
End: 2020-01-31
Attending: INTERNAL MEDICINE
Payer: COMMERCIAL

## 2020-01-31 ENCOUNTER — HOSPITAL ENCOUNTER (OUTPATIENT)
Facility: CLINIC | Age: 35
Setting detail: OBSERVATION
Discharge: HOME | End: 2020-02-02
Attending: INTERNAL MEDICINE | Admitting: STUDENT IN AN ORGANIZED HEALTH CARE EDUCATION/TRAINING PROGRAM
Payer: COMMERCIAL

## 2020-01-31 DIAGNOSIS — J47.0 BRONCHIECTASIS WITH ACUTE LOWER RESPIRATORY INFECTION (H): ICD-10-CM

## 2020-01-31 DIAGNOSIS — J10.00 PNEUMONIA OF BOTH LUNGS DUE TO INFLUENZA A VIRUS, UNSPECIFIED PART OF LUNG: ICD-10-CM

## 2020-01-31 DIAGNOSIS — J10.1 INFLUENZA A: ICD-10-CM

## 2020-01-31 DIAGNOSIS — J47.9 BRONCHIECTASIS WITHOUT ACUTE EXACERBATION (H): ICD-10-CM

## 2020-01-31 DIAGNOSIS — J18.9 PNEUMONIA OF BOTH LOWER LOBES DUE TO INFECTIOUS ORGANISM: ICD-10-CM

## 2020-01-31 DIAGNOSIS — R07.9 CHEST PAIN, UNSPECIFIED TYPE: ICD-10-CM

## 2020-01-31 DIAGNOSIS — J11.1 INFLUENZA: Primary | ICD-10-CM

## 2020-01-31 LAB
ALBUMIN SERPL-MCNC: 4.3 G/DL (ref 3.4–5)
ALBUMIN UR-MCNC: NEGATIVE MG/DL
ALP SERPL-CCNC: 143 U/L (ref 40–150)
ALT SERPL W P-5'-P-CCNC: 28 U/L (ref 0–70)
ANION GAP SERPL CALCULATED.3IONS-SCNC: 6 MMOL/L (ref 3–14)
APPEARANCE UR: CLEAR
AST SERPL W P-5'-P-CCNC: 20 U/L (ref 0–45)
BASOPHILS # BLD AUTO: 0 10E9/L (ref 0–0.2)
BASOPHILS NFR BLD AUTO: 0.1 %
BILIRUB SERPL-MCNC: 0.7 MG/DL (ref 0.2–1.3)
BILIRUB UR QL STRIP: NEGATIVE
BUN SERPL-MCNC: 11 MG/DL (ref 7–30)
CALCIUM SERPL-MCNC: 9.2 MG/DL (ref 8.5–10.1)
CHLORIDE SERPL-SCNC: 104 MMOL/L (ref 94–109)
CO2 SERPL-SCNC: 27 MMOL/L (ref 20–32)
COLOR UR AUTO: YELLOW
CREAT SERPL-MCNC: 1.29 MG/DL (ref 0.66–1.25)
CRP SERPL-MCNC: 47 MG/L (ref 0–8)
D DIMER PPP FEU-MCNC: 0.7 UG/ML FEU (ref 0–0.5)
DIFFERENTIAL METHOD BLD: ABNORMAL
EOSINOPHIL # BLD AUTO: 0 10E9/L (ref 0–0.7)
EOSINOPHIL NFR BLD AUTO: 0.5 %
ERYTHROCYTE [DISTWIDTH] IN BLOOD BY AUTOMATED COUNT: 12.3 % (ref 10–15)
FLUAV+FLUBV AG SPEC QL: NEGATIVE
FLUAV+FLUBV AG SPEC QL: NEGATIVE
FLUAV+FLUBV RNA SPEC QL NAA+PROBE: NEGATIVE
FLUAV+FLUBV RNA SPEC QL NAA+PROBE: POSITIVE
GFR SERPL CREATININE-BSD FRML MDRD: 72 ML/MIN/{1.73_M2}
GLUCOSE SERPL-MCNC: 94 MG/DL (ref 70–99)
GLUCOSE UR STRIP-MCNC: NEGATIVE MG/DL
HCT VFR BLD AUTO: 45.6 % (ref 40–53)
HGB BLD-MCNC: 16.2 G/DL (ref 13.3–17.7)
HGB UR QL STRIP: NEGATIVE
IMM GRANULOCYTES # BLD: 0 10E9/L (ref 0–0.4)
IMM GRANULOCYTES NFR BLD: 0.3 %
INR PPP: 1.08 (ref 0.86–1.14)
INTERPRETATION ECG - MUSE: NORMAL
KETONES UR STRIP-MCNC: 5 MG/DL
LACTATE BLD-SCNC: 1.7 MMOL/L (ref 0.7–2)
LEUKOCYTE ESTERASE UR QL STRIP: NEGATIVE
LYMPHOCYTES # BLD AUTO: 0.5 10E9/L (ref 0.8–5.3)
LYMPHOCYTES NFR BLD AUTO: 6.4 %
MCH RBC QN AUTO: 29.9 PG (ref 26.5–33)
MCHC RBC AUTO-ENTMCNC: 35.5 G/DL (ref 31.5–36.5)
MCV RBC AUTO: 84 FL (ref 78–100)
MONOCYTES # BLD AUTO: 0.4 10E9/L (ref 0–1.3)
MONOCYTES NFR BLD AUTO: 5 %
MUCOUS THREADS #/AREA URNS LPF: PRESENT /LPF
NEUTROPHILS # BLD AUTO: 6.5 10E9/L (ref 1.6–8.3)
NEUTROPHILS NFR BLD AUTO: 87.7 %
NITRATE UR QL: NEGATIVE
NRBC # BLD AUTO: 0 10*3/UL
NRBC BLD AUTO-RTO: 0 /100
NT-PROBNP SERPL-MCNC: 21 PG/ML (ref 0–450)
PH UR STRIP: 5.5 PH (ref 5–7)
PLATELET # BLD AUTO: 215 10E9/L (ref 150–450)
POTASSIUM SERPL-SCNC: 3.7 MMOL/L (ref 3.4–5.3)
PROT SERPL-MCNC: 8.6 G/DL (ref 6.8–8.8)
RBC # BLD AUTO: 5.42 10E12/L (ref 4.4–5.9)
RBC #/AREA URNS AUTO: <1 /HPF (ref 0–2)
RSV RNA SPEC NAA+PROBE: NEGATIVE
SODIUM SERPL-SCNC: 137 MMOL/L (ref 133–144)
SOURCE: ABNORMAL
SP GR UR STRIP: >1.035 (ref 1–1.03)
SPECIMEN SOURCE: ABNORMAL
SPECIMEN SOURCE: NORMAL
TROPONIN I SERPL-MCNC: <0.015 UG/L (ref 0–0.04)
UROBILINOGEN UR STRIP-MCNC: NORMAL MG/DL (ref 0–2)
WBC # BLD AUTO: 7.5 10E9/L (ref 4–11)
WBC #/AREA URNS AUTO: 0 /HPF (ref 0–5)

## 2020-01-31 PROCEDURE — 87040 BLOOD CULTURE FOR BACTERIA: CPT | Performed by: INTERNAL MEDICINE

## 2020-01-31 PROCEDURE — 87804 INFLUENZA ASSAY W/OPTIC: CPT | Performed by: INTERNAL MEDICINE

## 2020-01-31 PROCEDURE — 96365 THER/PROPH/DIAG IV INF INIT: CPT | Mod: 59 | Performed by: INTERNAL MEDICINE

## 2020-01-31 PROCEDURE — 74177 CT ABD & PELVIS W/CONTRAST: CPT

## 2020-01-31 PROCEDURE — 36415 COLL VENOUS BLD VENIPUNCTURE: CPT | Performed by: INTERNAL MEDICINE

## 2020-01-31 PROCEDURE — 80053 COMPREHEN METABOLIC PANEL: CPT | Performed by: INTERNAL MEDICINE

## 2020-01-31 PROCEDURE — 86140 C-REACTIVE PROTEIN: CPT | Performed by: INTERNAL MEDICINE

## 2020-01-31 PROCEDURE — 96367 TX/PROPH/DG ADDL SEQ IV INF: CPT | Performed by: INTERNAL MEDICINE

## 2020-01-31 PROCEDURE — 99285 EMERGENCY DEPT VISIT HI MDM: CPT | Mod: 25 | Performed by: INTERNAL MEDICINE

## 2020-01-31 PROCEDURE — 83880 ASSAY OF NATRIURETIC PEPTIDE: CPT | Performed by: INTERNAL MEDICINE

## 2020-01-31 PROCEDURE — 83605 ASSAY OF LACTIC ACID: CPT | Performed by: INTERNAL MEDICINE

## 2020-01-31 PROCEDURE — 87631 RESP VIRUS 3-5 TARGETS: CPT | Performed by: INTERNAL MEDICINE

## 2020-01-31 PROCEDURE — 25000132 ZZH RX MED GY IP 250 OP 250 PS 637: Performed by: INTERNAL MEDICINE

## 2020-01-31 PROCEDURE — 25000128 H RX IP 250 OP 636: Performed by: INTERNAL MEDICINE

## 2020-01-31 PROCEDURE — 85610 PROTHROMBIN TIME: CPT | Performed by: INTERNAL MEDICINE

## 2020-01-31 PROCEDURE — 84484 ASSAY OF TROPONIN QUANT: CPT | Performed by: INTERNAL MEDICINE

## 2020-01-31 PROCEDURE — 96361 HYDRATE IV INFUSION ADD-ON: CPT | Performed by: INTERNAL MEDICINE

## 2020-01-31 PROCEDURE — 81001 URINALYSIS AUTO W/SCOPE: CPT | Performed by: INTERNAL MEDICINE

## 2020-01-31 PROCEDURE — 71046 X-RAY EXAM CHEST 2 VIEWS: CPT

## 2020-01-31 PROCEDURE — 85379 FIBRIN DEGRADATION QUANT: CPT | Performed by: INTERNAL MEDICINE

## 2020-01-31 PROCEDURE — 25800030 ZZH RX IP 258 OP 636: Performed by: INTERNAL MEDICINE

## 2020-01-31 PROCEDURE — 93005 ELECTROCARDIOGRAM TRACING: CPT | Performed by: INTERNAL MEDICINE

## 2020-01-31 PROCEDURE — 85025 COMPLETE CBC W/AUTO DIFF WBC: CPT | Performed by: INTERNAL MEDICINE

## 2020-01-31 PROCEDURE — 96375 TX/PRO/DX INJ NEW DRUG ADDON: CPT | Mod: 59 | Performed by: INTERNAL MEDICINE

## 2020-01-31 PROCEDURE — 25000125 ZZHC RX 250: Performed by: INTERNAL MEDICINE

## 2020-01-31 PROCEDURE — 93010 ELECTROCARDIOGRAM REPORT: CPT | Mod: Z6 | Performed by: INTERNAL MEDICINE

## 2020-01-31 RX ORDER — CEFTRIAXONE 1 G/1
1 INJECTION, POWDER, FOR SOLUTION INTRAMUSCULAR; INTRAVENOUS ONCE
Status: COMPLETED | OUTPATIENT
Start: 2020-01-31 | End: 2020-01-31

## 2020-01-31 RX ORDER — OSELTAMIVIR PHOSPHATE 75 MG/1
75 CAPSULE ORAL ONCE
Status: COMPLETED | OUTPATIENT
Start: 2020-01-31 | End: 2020-01-31

## 2020-01-31 RX ORDER — IBUPROFEN 600 MG/1
600 TABLET, FILM COATED ORAL ONCE
Status: COMPLETED | OUTPATIENT
Start: 2020-01-31 | End: 2020-01-31

## 2020-01-31 RX ORDER — IOPAMIDOL 755 MG/ML
100 INJECTION, SOLUTION INTRAVASCULAR ONCE
Status: COMPLETED | OUTPATIENT
Start: 2020-01-31 | End: 2020-01-31

## 2020-01-31 RX ORDER — HYDROMORPHONE HYDROCHLORIDE 1 MG/ML
0.5 INJECTION, SOLUTION INTRAMUSCULAR; INTRAVENOUS; SUBCUTANEOUS ONCE
Status: COMPLETED | OUTPATIENT
Start: 2020-01-31 | End: 2020-01-31

## 2020-01-31 RX ORDER — SODIUM CHLORIDE 9 MG/ML
1000 INJECTION, SOLUTION INTRAVENOUS CONTINUOUS
Status: DISCONTINUED | OUTPATIENT
Start: 2020-01-31 | End: 2020-02-01

## 2020-01-31 RX ORDER — ACETAMINOPHEN 325 MG/1
650 TABLET ORAL EVERY 4 HOURS PRN
Status: DISCONTINUED | OUTPATIENT
Start: 2020-01-31 | End: 2020-02-01 | Stop reason: CLARIF

## 2020-01-31 RX ADMIN — HYDROMORPHONE HYDROCHLORIDE 0.5 MG: 1 INJECTION, SOLUTION INTRAMUSCULAR; INTRAVENOUS; SUBCUTANEOUS at 22:45

## 2020-01-31 RX ADMIN — IOPAMIDOL 83 ML: 755 INJECTION, SOLUTION INTRAVENOUS at 19:50

## 2020-01-31 RX ADMIN — SODIUM CHLORIDE 61 ML: 9 INJECTION, SOLUTION INTRAVENOUS at 19:51

## 2020-01-31 RX ADMIN — ACETAMINOPHEN 650 MG: 325 TABLET, FILM COATED ORAL at 19:28

## 2020-01-31 RX ADMIN — SODIUM CHLORIDE 1000 ML: 9 INJECTION, SOLUTION INTRAVENOUS at 19:29

## 2020-01-31 RX ADMIN — OSELTAMIVIR PHOSPHATE 75 MG: 75 CAPSULE ORAL at 22:49

## 2020-01-31 RX ADMIN — SODIUM CHLORIDE 1000 ML: 9 INJECTION, SOLUTION INTRAVENOUS at 17:45

## 2020-01-31 RX ADMIN — IBUPROFEN 600 MG: 600 TABLET ORAL at 17:44

## 2020-01-31 RX ADMIN — CEFTRIAXONE SODIUM 1 G: 1 INJECTION, POWDER, FOR SOLUTION INTRAMUSCULAR; INTRAVENOUS at 20:47

## 2020-01-31 RX ADMIN — AZITHROMYCIN MONOHYDRATE 500 MG: 500 INJECTION, POWDER, LYOPHILIZED, FOR SOLUTION INTRAVENOUS at 21:48

## 2020-01-31 ASSESSMENT — ENCOUNTER SYMPTOMS
PALPITATIONS: 0
SHORTNESS OF BREATH: 1
DIARRHEA: 0
BACK PAIN: 0
ADENOPATHY: 0
NAUSEA: 1
WHEEZING: 0
ABDOMINAL PAIN: 1
SORE THROAT: 0
CHILLS: 1
FEVER: 1
DYSURIA: 1
NECK PAIN: 0
CONFUSION: 0
COUGH: 1
WEAKNESS: 0
VOMITING: 1
LIGHT-HEADEDNESS: 0
NUMBNESS: 0
HEADACHES: 1

## 2020-01-31 NOTE — ED PROVIDER NOTES
History     Chief Complaint   Patient presents with     Chest Pain     Chest pain that started last night with SOB     Flu Symptoms     Body ache,fatigue, vomiting fever and chills that satrted yesterday     HPI  Palu Alcantara is a 35 year old male who presents with onset at 3 AM of fever, shortness of breath, chills, nausea, vomiting myalgias, RLQ pain and chest pain, mostly after vomiting. He has mild cough. He is short of breath at rest. He has burning with urination. His legs ache. He has no back pain.He has had no recent travel or ill exposures.      History reviewed. No pertinent past medical history.    History reviewed. No pertinent surgical history.    Family History   Problem Relation Age of Onset     Family History Negative Mother      Family History Negative Father        Social History     Tobacco Use     Smoking status: Never Smoker     Smokeless tobacco: Never Used   Substance Use Topics     Alcohol use: No         I have reviewed the Medications, Allergies, Past Medical and Surgical History, and Social History in the Epic system.    Review of Systems   Constitutional: Positive for chills and fever.   HENT: Negative for congestion and sore throat.    Eyes: Negative for visual disturbance.   Respiratory: Positive for cough and shortness of breath. Negative for wheezing.    Cardiovascular: Positive for chest pain. Negative for palpitations and leg swelling.   Gastrointestinal: Positive for abdominal pain, nausea and vomiting. Negative for diarrhea.   Genitourinary: Positive for dysuria.   Musculoskeletal: Negative for back pain and neck pain.   Skin: Negative for rash.   Neurological: Positive for headaches. Negative for weakness, light-headedness and numbness.   Hematological: Negative for adenopathy.   Psychiatric/Behavioral: Negative for confusion.       Physical Exam   BP: 121/76  Pulse: 123  Temp: 102.3  F (39.1  C)  Resp: 16  Weight: 76.7 kg (169 lb)  SpO2: 92 %      Physical  Exam  Vitals signs and nursing note reviewed.   Constitutional:       Appearance: He is well-developed. He is ill-appearing.   HENT:      Head: Normocephalic and atraumatic.   Eyes:      Extraocular Movements: Extraocular movements intact.      Pupils: Pupils are equal, round, and reactive to light.   Neck:      Musculoskeletal: Neck supple.   Cardiovascular:      Rate and Rhythm: Regular rhythm. Tachycardia present.      Heart sounds: S1 normal and S2 normal. No murmur.   Pulmonary:      Effort: Respiratory distress (mild) present.      Breath sounds: Examination of the right-middle field reveals rales. Examination of the right-lower field reveals rales. Rales present.   Abdominal:      General: Abdomen is flat.      Palpations: Abdomen is soft.      Tenderness: There is abdominal tenderness in the right lower quadrant and suprapubic area.   Musculoskeletal:      Right lower leg: No edema.      Left lower leg: No edema.   Skin:     General: Skin is warm and dry.      Findings: No rash.   Neurological:      General: No focal deficit present.      Mental Status: He is alert and oriented to person, place, and time.      Cranial Nerves: No cranial nerve deficit.   Psychiatric:         Mood and Affect: Mood normal.         Behavior: Behavior normal.         ED Course        Procedures             EKG Interpretation:      Interpreted by NOEMI BRISCOE MD  Time reviewed: 1716  Symptoms at time of EKG: chest pain, fever   Rhythm: sinus tachycardia  Rate: 110-120  Axis: Normal  Ectopy: none  Conduction: normal  ST Segments/ T Waves: T wave inversion II, III and aVF  Q Waves: none  Comparison to prior: Unchanged from 04/07/18    Clinical Impression: non-specific EKG      Labs/Imaging    Results for orders placed or performed during the hospital encounter of 01/31/20 (from the past 24 hour(s))   EKG 12 lead   Result Value Ref Range    Interpretation ECG Click View Image link to view waveform and result    Lactic acid whole  blood   Result Value Ref Range    Lactic Acid 1.7 0.7 - 2.0 mmol/L   CBC with platelets differential   Result Value Ref Range    WBC 7.5 4.0 - 11.0 10e9/L    RBC Count 5.42 4.4 - 5.9 10e12/L    Hemoglobin 16.2 13.3 - 17.7 g/dL    Hematocrit 45.6 40.0 - 53.0 %    MCV 84 78 - 100 fl    MCH 29.9 26.5 - 33.0 pg    MCHC 35.5 31.5 - 36.5 g/dL    RDW 12.3 10.0 - 15.0 %    Platelet Count 215 150 - 450 10e9/L    Diff Method Automated Method     % Neutrophils 87.7 %    % Lymphocytes 6.4 %    % Monocytes 5.0 %    % Eosinophils 0.5 %    % Basophils 0.1 %    % Immature Granulocytes 0.3 %    Nucleated RBCs 0 0 /100    Absolute Neutrophil 6.5 1.6 - 8.3 10e9/L    Absolute Lymphocytes 0.5 (L) 0.8 - 5.3 10e9/L    Absolute Monocytes 0.4 0.0 - 1.3 10e9/L    Absolute Eosinophils 0.0 0.0 - 0.7 10e9/L    Absolute Basophils 0.0 0.0 - 0.2 10e9/L    Abs Immature Granulocytes 0.0 0 - 0.4 10e9/L    Absolute Nucleated RBC 0.0    INR   Result Value Ref Range    INR 1.08 0.86 - 1.14   Comprehensive metabolic panel   Result Value Ref Range    Sodium 137 133 - 144 mmol/L    Potassium 3.7 3.4 - 5.3 mmol/L    Chloride 104 94 - 109 mmol/L    Carbon Dioxide 27 20 - 32 mmol/L    Anion Gap 6 3 - 14 mmol/L    Glucose 94 70 - 99 mg/dL    Urea Nitrogen 11 7 - 30 mg/dL    Creatinine 1.29 (H) 0.66 - 1.25 mg/dL    GFR Estimate 72 >60 mL/min/[1.73_m2]    GFR Estimate If Black 83 >60 mL/min/[1.73_m2]    Calcium 9.2 8.5 - 10.1 mg/dL    Bilirubin Total 0.7 0.2 - 1.3 mg/dL    Albumin 4.3 3.4 - 5.0 g/dL    Protein Total 8.6 6.8 - 8.8 g/dL    Alkaline Phosphatase 143 40 - 150 U/L    ALT 28 0 - 70 U/L    AST 20 0 - 45 U/L   CRP inflammation   Result Value Ref Range    CRP Inflammation 47.0 (H) 0.0 - 8.0 mg/L   Troponin I   Result Value Ref Range    Troponin I ES <0.015 0.000 - 0.045 ug/L   D dimer quantitative   Result Value Ref Range    D Dimer 0.7 (H) 0.0 - 0.50 ug/ml FEU   Nt probnp inpatient (BNP)   Result Value Ref Range    N-Terminal Pro BNP Inpatient 21 0 - 450  pg/mL   Blood Culture ONE site   Result Value Ref Range    Specimen Description Blood Left Arm     Culture Micro No growth after 1 hour    Influenza A/B antigen   Result Value Ref Range    Influenza A/B Agn Specimen Nasopharyngeal     Influenza A Negative NEG^Negative    Influenza B Negative NEG^Negative   Influenza A and B and RSV PCR   Result Value Ref Range    Specimen Description Nasopharyngeal     Influenza A PCR Positive (A) NEG^Negative    Influenza B PCR Negative NEG^Negative    Resp Syncytial Virus Negative NEG^Negative   XR Chest 2 Views    Narrative    XR CHEST TWO VIEWS   1/31/2020 6:25 PM     HISTORY: Chest pain, fever.    COMPARISON: 5/20/2019.      Impression    IMPRESSION: No new airspace disease identified. Suggestion of some  bronchiectasis at the left lower lung that appears similar to the  prior exam. Normal cardiac silhouette.    OG BRANNON MD   CT Chest/Abdomen/Pelvis w Contrast    Narrative    CT CHEST/ABDOMEN/PELVIS WITH CONTRAST  1/31/2020 7:55 PM    HISTORY:  Fever right lower quadrant pain, cough, shortness of breath,  chest pain.    TECHNIQUE: CT scan obtained of the chest, abdomen, and pelvis with  oral and IV contrast. 83mL Isovue 370 IV injected. Radiation dose for  this scan was reduced using automated exposure control, adjustment of  the mA and/or kV according to patient size, or iterative  reconstruction technique.    COMPARISON:  Chest x-ray 1/31/2020.    FINDINGS:  Chest: No acute thoracic aortic abnormality. The exam is not tailored  for specific assessment of pulmonary embolism. No central large  pulmonary embolism is seen. The medium and small branch vessels cannot  be fully assessed. No effusions. No suspicious enlarged lymph nodes.    There are bilateral regions of moderate bronchiectasis, dominantly  seen at the left lower lobe inferiorly, right middle lobe, and also  mildly identified at the left upper lobe. There are areas of moderate  wall thickening of the  bronchiectasis particularly at the left lower  lobe. Mild areas of wall thickening of the bronchiectasis at the right  middle lobe noted. A few nonspecific nodules are also seen that could  be mucus impaction, but these are indeterminant. An example is 0.9 cm  medial right lower lobe series 6 image 273. There are other examples  bilaterally.    Abdomen/pelvis: Liver, gallbladder, adrenals, spleen, pancreas, and  kidneys do not show any acute abnormalities. No abscess or free air.  Normal appendix. A few small bowel loops are mildly distended with  fluid and gas. This is not convincing for small-bowel obstruction. No  suspicious enlarged lymph nodes. Normal abdominal aorta. No  aggressive-appearing bony lesions seen in the chest, abdomen, or  pelvis.      Impression    IMPRESSION:  1. Bilateral regions of moderate bronchiectasis. There are some areas  of moderate bronchial wall thickening with mucus impaction that could  represent an infectious or inflammatory etiology.  2. A few small bowel loops are mildly distended with fluid and gas and  may relate to an ileus and/or enteritis.  3. A few indeterminate pulmonary nodules. Given the bronchiectasis and  areas of mucus impaction, some of the nodules may just be mucus  impaction. Suggest follow-up CT chest in 6 months for surveillance.    OG BRANNON MD   UA with Microscopic   Result Value Ref Range    Color Urine Yellow     Appearance Urine Clear     Glucose Urine Negative NEG^Negative mg/dL    Bilirubin Urine Negative NEG^Negative    Ketones Urine 5 (A) NEG^Negative mg/dL    Specific Gravity Urine >1.035 (H) 1.003 - 1.035    Blood Urine Negative NEG^Negative    pH Urine 5.5 5.0 - 7.0 pH    Protein Albumin Urine Negative NEG^Negative mg/dL    Urobilinogen mg/dL Normal 0.0 - 2.0 mg/dL    Nitrite Urine Negative NEG^Negative    Leukocyte Esterase Urine Negative NEG^Negative    Source Midstream Urine     WBC Urine 0 0 - 5 /HPF    RBC Urine <1 0 - 2 /HPF    Mucous Urine  Present (A) NEG^Negative /LPF   Procalcitonin   Result Value Ref Range    Procalcitonin 2.80 ng/ml       Assessments & Plan (with Medical Decision Making)   Impression:  Young male presents with fever, cough, myalgias, malaise, RLQ abdominal pain and headache. He has chronic atelectasis of the RML on CXR. Temperature 102.3 on arrival. He has rale in the right base anteriorly and posteriorly. Rapid influenza was negative. WBC normal. CRP elevated. Given abnormal chest exam and abdominal pain with fever, CT of the chest, abdomen and pelvis was obtained. This demonstrates bilateral lower lobe atelectasis with mucus plugging. Abdominal CT was unremarkable other than some mildly distended loops of bowel. A normal appendix was visualized. Given clinical impression of possible influenza and recent false negative rapid influenza antigen, Influenza PCR was sent. This was positive for influenza A. UA is normal. Given the underlying bronchiectasis, mucus plugging and infiltrate in both lungs I would like to bring him in for observation. He was treated with ceftriaxone, azithromycin and Tamiflu in the ED. He will be admitted on the [facility].    I have reviewed the nursing notes.    I have reviewed the findings, diagnosis, plan and need for follow up with the patient.    Current Discharge Medication List          Final diagnoses:   Influenza A   Bronchiectasis with acute lower respiratory infection (H)   Pneumonia of both lower lobes due to infectious organism (H)       1/31/2020   [facility], [place], EMERGENCY DEPARTMENT     Rafa Sarabia MD  02/01/20 1365

## 2020-02-01 PROBLEM — J11.1 INFLUENZA: Status: ACTIVE | Noted: 2020-02-01

## 2020-02-01 LAB
ANION GAP SERPL CALCULATED.3IONS-SCNC: 7 MMOL/L (ref 3–14)
BUN SERPL-MCNC: 10 MG/DL (ref 7–30)
CALCIUM SERPL-MCNC: 8 MG/DL (ref 8.5–10.1)
CHLORIDE SERPL-SCNC: 109 MMOL/L (ref 94–109)
CO2 SERPL-SCNC: 23 MMOL/L (ref 20–32)
CREAT SERPL-MCNC: 1.08 MG/DL (ref 0.66–1.25)
ERYTHROCYTE [DISTWIDTH] IN BLOOD BY AUTOMATED COUNT: 12.4 % (ref 10–15)
GFR SERPL CREATININE-BSD FRML MDRD: 89 ML/MIN/{1.73_M2}
GLUCOSE SERPL-MCNC: 91 MG/DL (ref 70–99)
HCT VFR BLD AUTO: 39.7 % (ref 40–53)
HGB BLD-MCNC: 13.7 G/DL (ref 13.3–17.7)
LACTATE BLD-SCNC: 1.5 MMOL/L (ref 0.7–2)
MCH RBC QN AUTO: 29.3 PG (ref 26.5–33)
MCHC RBC AUTO-ENTMCNC: 34.5 G/DL (ref 31.5–36.5)
MCV RBC AUTO: 85 FL (ref 78–100)
PLATELET # BLD AUTO: 189 10E9/L (ref 150–450)
POTASSIUM SERPL-SCNC: 3.7 MMOL/L (ref 3.4–5.3)
PROCALCITONIN SERPL-MCNC: 2.8 NG/ML
RBC # BLD AUTO: 4.67 10E12/L (ref 4.4–5.9)
SODIUM SERPL-SCNC: 139 MMOL/L (ref 133–144)
WBC # BLD AUTO: 8.4 10E9/L (ref 4–11)

## 2020-02-01 PROCEDURE — 25800030 ZZH RX IP 258 OP 636: Performed by: INTERNAL MEDICINE

## 2020-02-01 PROCEDURE — 25000128 H RX IP 250 OP 636: Performed by: STUDENT IN AN ORGANIZED HEALTH CARE EDUCATION/TRAINING PROGRAM

## 2020-02-01 PROCEDURE — 99220 ZZC INITIAL OBSERVATION CARE,LEVL III: CPT | Performed by: STUDENT IN AN ORGANIZED HEALTH CARE EDUCATION/TRAINING PROGRAM

## 2020-02-01 PROCEDURE — 84145 PROCALCITONIN (PCT): CPT | Performed by: STUDENT IN AN ORGANIZED HEALTH CARE EDUCATION/TRAINING PROGRAM

## 2020-02-01 PROCEDURE — 85027 COMPLETE CBC AUTOMATED: CPT | Performed by: INTERNAL MEDICINE

## 2020-02-01 PROCEDURE — 25000132 ZZH RX MED GY IP 250 OP 250 PS 637: Performed by: STUDENT IN AN ORGANIZED HEALTH CARE EDUCATION/TRAINING PROGRAM

## 2020-02-01 PROCEDURE — 40000275 ZZH STATISTIC RCP TIME EA 10 MIN

## 2020-02-01 PROCEDURE — G0378 HOSPITAL OBSERVATION PER HR: HCPCS

## 2020-02-01 PROCEDURE — 25000125 ZZHC RX 250: Performed by: INTERNAL MEDICINE

## 2020-02-01 PROCEDURE — 25000128 H RX IP 250 OP 636: Performed by: INTERNAL MEDICINE

## 2020-02-01 PROCEDURE — 25000132 ZZH RX MED GY IP 250 OP 250 PS 637: Performed by: INTERNAL MEDICINE

## 2020-02-01 PROCEDURE — 83605 ASSAY OF LACTIC ACID: CPT | Performed by: INTERNAL MEDICINE

## 2020-02-01 PROCEDURE — 36415 COLL VENOUS BLD VENIPUNCTURE: CPT | Performed by: INTERNAL MEDICINE

## 2020-02-01 PROCEDURE — 96375 TX/PRO/DX INJ NEW DRUG ADDON: CPT

## 2020-02-01 PROCEDURE — 80048 BASIC METABOLIC PNL TOTAL CA: CPT | Performed by: INTERNAL MEDICINE

## 2020-02-01 PROCEDURE — 25800030 ZZH RX IP 258 OP 636: Performed by: STUDENT IN AN ORGANIZED HEALTH CARE EDUCATION/TRAINING PROGRAM

## 2020-02-01 RX ORDER — ACETAMINOPHEN 325 MG/1
650 TABLET ORAL EVERY 4 HOURS PRN
Status: DISCONTINUED | OUTPATIENT
Start: 2020-02-01 | End: 2020-02-01

## 2020-02-01 RX ORDER — OSELTAMIVIR PHOSPHATE 6 MG/ML
75 FOR SUSPENSION ORAL 2 TIMES DAILY
Status: DISCONTINUED | OUTPATIENT
Start: 2020-02-01 | End: 2020-02-01 | Stop reason: CLARIF

## 2020-02-01 RX ORDER — CODEINE PHOSPHATE AND GUAIFENESIN 10; 100 MG/5ML; MG/5ML
10 SOLUTION ORAL EVERY 4 HOURS PRN
Status: DISCONTINUED | OUTPATIENT
Start: 2020-02-01 | End: 2020-02-02 | Stop reason: HOSPADM

## 2020-02-01 RX ORDER — OSELTAMIVIR PHOSPHATE 75 MG/1
75 CAPSULE ORAL 2 TIMES DAILY
Status: DISCONTINUED | OUTPATIENT
Start: 2020-02-01 | End: 2020-02-02 | Stop reason: HOSPADM

## 2020-02-01 RX ORDER — ALBUTEROL SULFATE 0.83 MG/ML
2.5 SOLUTION RESPIRATORY (INHALATION)
Status: DISCONTINUED | OUTPATIENT
Start: 2020-02-01 | End: 2020-02-01

## 2020-02-01 RX ORDER — ACETAMINOPHEN 650 MG/1
650 SUPPOSITORY RECTAL EVERY 4 HOURS PRN
Status: DISCONTINUED | OUTPATIENT
Start: 2020-02-01 | End: 2020-02-01

## 2020-02-01 RX ORDER — IBUPROFEN 200 MG
400 TABLET ORAL EVERY 6 HOURS PRN
Status: DISCONTINUED | OUTPATIENT
Start: 2020-02-01 | End: 2020-02-01 | Stop reason: CLARIF

## 2020-02-01 RX ORDER — OSELTAMIVIR PHOSPHATE 75 MG/1
75 CAPSULE ORAL 2 TIMES DAILY
Status: DISCONTINUED | OUTPATIENT
Start: 2020-02-01 | End: 2020-02-01 | Stop reason: CLARIF

## 2020-02-01 RX ORDER — ONDANSETRON 4 MG/1
4 TABLET, ORALLY DISINTEGRATING ORAL EVERY 6 HOURS PRN
Status: DISCONTINUED | OUTPATIENT
Start: 2020-02-01 | End: 2020-02-02 | Stop reason: HOSPADM

## 2020-02-01 RX ORDER — SODIUM CHLORIDE, SODIUM LACTATE, POTASSIUM CHLORIDE, CALCIUM CHLORIDE 600; 310; 30; 20 MG/100ML; MG/100ML; MG/100ML; MG/100ML
INJECTION, SOLUTION INTRAVENOUS CONTINUOUS
Status: DISCONTINUED | OUTPATIENT
Start: 2020-02-01 | End: 2020-02-02

## 2020-02-01 RX ORDER — DIPHENHYDRAMINE HCL 25 MG
25-50 CAPSULE ORAL
Status: DISCONTINUED | OUTPATIENT
Start: 2020-02-01 | End: 2020-02-02 | Stop reason: HOSPADM

## 2020-02-01 RX ORDER — IBUPROFEN 100 MG/5ML
400 SUSPENSION, ORAL (FINAL DOSE FORM) ORAL EVERY 6 HOURS PRN
Status: DISCONTINUED | OUTPATIENT
Start: 2020-02-01 | End: 2020-02-02 | Stop reason: HOSPADM

## 2020-02-01 RX ORDER — LEVOFLOXACIN 5 MG/ML
500 INJECTION, SOLUTION INTRAVENOUS EVERY 24 HOURS
Status: DISCONTINUED | OUTPATIENT
Start: 2020-02-01 | End: 2020-02-02

## 2020-02-01 RX ORDER — NALOXONE HYDROCHLORIDE 0.4 MG/ML
.1-.4 INJECTION, SOLUTION INTRAMUSCULAR; INTRAVENOUS; SUBCUTANEOUS
Status: DISCONTINUED | OUTPATIENT
Start: 2020-02-01 | End: 2020-02-02 | Stop reason: HOSPADM

## 2020-02-01 RX ORDER — ONDANSETRON 2 MG/ML
4 INJECTION INTRAMUSCULAR; INTRAVENOUS EVERY 6 HOURS PRN
Status: DISCONTINUED | OUTPATIENT
Start: 2020-02-01 | End: 2020-02-02 | Stop reason: HOSPADM

## 2020-02-01 RX ADMIN — SODIUM CHLORIDE, POTASSIUM CHLORIDE, SODIUM LACTATE AND CALCIUM CHLORIDE: 600; 310; 30; 20 INJECTION, SOLUTION INTRAVENOUS at 04:47

## 2020-02-01 RX ADMIN — LEVOFLOXACIN 500 MG: 5 INJECTION, SOLUTION INTRAVENOUS at 10:01

## 2020-02-01 RX ADMIN — SODIUM CHLORIDE, POTASSIUM CHLORIDE, SODIUM LACTATE AND CALCIUM CHLORIDE: 600; 310; 30; 20 INJECTION, SOLUTION INTRAVENOUS at 20:44

## 2020-02-01 RX ADMIN — ACETAMINOPHEN, ASPIRIN AND CAFFEINE 1 TABLET: 250; 250; 65 TABLET, FILM COATED ORAL at 08:26

## 2020-02-01 RX ADMIN — Medication 1 MG: at 06:52

## 2020-02-01 RX ADMIN — ONDANSETRON 4 MG: 2 INJECTION INTRAMUSCULAR; INTRAVENOUS at 04:58

## 2020-02-01 RX ADMIN — OSELTAMIVIR PHOSPHATE 75 MG: 75 CAPSULE ORAL at 20:44

## 2020-02-01 RX ADMIN — SODIUM CHLORIDE 1000 ML: 9 INJECTION, SOLUTION INTRAVENOUS at 00:18

## 2020-02-01 RX ADMIN — SODIUM CHLORIDE, POTASSIUM CHLORIDE, SODIUM LACTATE AND CALCIUM CHLORIDE: 600; 310; 30; 20 INJECTION, SOLUTION INTRAVENOUS at 04:49

## 2020-02-01 RX ADMIN — OSELTAMIVIR PHOSPHATE 75 MG: 75 CAPSULE ORAL at 08:24

## 2020-02-01 RX ADMIN — ACETAMINOPHEN 650 MG: 325 TABLET, FILM COATED ORAL at 14:25

## 2020-02-01 RX ADMIN — ALBUTEROL SULFATE 2.5 MG: 2.5 SOLUTION RESPIRATORY (INHALATION) at 11:25

## 2020-02-01 RX ADMIN — ACETAMINOPHEN 650 MG: 325 TABLET, FILM COATED ORAL at 01:18

## 2020-02-01 RX ADMIN — IBUPROFEN 400 MG: 200 TABLET, FILM COATED ORAL at 16:02

## 2020-02-01 RX ADMIN — SODIUM CHLORIDE, POTASSIUM CHLORIDE, SODIUM LACTATE AND CALCIUM CHLORIDE: 600; 310; 30; 20 INJECTION, SOLUTION INTRAVENOUS at 14:20

## 2020-02-01 RX ADMIN — ACETAMINOPHEN 650 MG: 325 SOLUTION ORAL at 20:05

## 2020-02-01 RX ADMIN — IBUPROFEN 400 MG: 100 SUSPENSION ORAL at 21:59

## 2020-02-01 RX ADMIN — ACETAMINOPHEN 650 MG: 325 TABLET, FILM COATED ORAL at 05:08

## 2020-02-01 NOTE — PLAN OF CARE
Patient seen and reviewed care with Dr Gaines in am and spoke with nursing     Add levaquin   Excedrin for headache  Robitussin AC fpr cough   Albuterol nebs  Continue IVF   Check creatinine

## 2020-02-01 NOTE — PLAN OF CARE
Pt A/O X 4. Is on Droplet Precautions. Oral temperature 101.6, Ibuprofen given, Cool washcloths placed on forehead and armpit regions, Moonlighter called and informed. Oral temperature recheck 99.9. VSS. Lungs-Slight crackles bilaterally with both anterior and posterior. Bowels-Hyperactive in all four quadrants. Voids spontaneously without difficulty in the bathroom. Pt complains of painful urination at times, Moonlighter notified. Pt stated that he has intermittent nausea at times. CMS and Neuro's are intact. Denies numbness and tingling in all extremities. Has generalized body pain and given Ibuprofen which gave moderate relief. Is on a Regular diet and appetite was Fair this shift. Pt up in room independently. PIV patent in the left arm and infusing LR @ 150 ml/hr. Bilateral heels are elevated off the bed. Pt is able to make needs known, and call light is within reach. Continue to monitor.

## 2020-02-01 NOTE — PLAN OF CARE
Pt arrived to unit around 0100, transferred to room. Family is possibly staying overnight. No other new concerns at this time.

## 2020-02-01 NOTE — PROVIDER NOTIFICATION
"MD notified of Pt triggering sepsis protocol.  Text page was as follows:    \"Pt triggered sepsis protocol.     Temp: 101.9  HR: 101  Tylenol given at 0508    Thanks  Cherelle #[id number]\"    Awaiting MD response  "

## 2020-02-01 NOTE — ED NOTES
[facility], [place]   ED Nurse to Floor Handoff     Paul Alcantara is a 35 year old male who speaks English and lives with a spouse,  in a home  They arrived in the ED by car from home    ED Chief Complaint: Chest Pain (Chest pain that started last night with SOB) and Flu Symptoms (Body ache,fatigue, vomiting fever and chills that satrted yesterday)    ED Dx;   Final diagnoses:   Influenza A   Bronchiectasis with acute lower respiratory infection (H)   Pneumonia of both lower lobes due to infectious organism (H)         Needed?: No    Allergies: No Known Allergies.  Past Medical Hx: History reviewed. No pertinent past medical history.   Baseline Mental status: WDL  Current Mental Status changes: at basesline    Infection present or suspected this encounter: cultures pending  Sepsis suspected: No  Isolation type: Droplet     Activity level - Baseline/Home:  Independent  Activity Level - Current:   Independent    Bariatric equipment needed?: No    In the ED these meds were given:   Medications   0.9% sodium chloride BOLUS (0 mLs Intravenous Stopped 1/31/20 1928)     Followed by   sodium chloride 0.9% infusion (1,000 mLs Intravenous New Bag 1/31/20 1929)   acetaminophen (TYLENOL) tablet 650 mg (650 mg Oral Given 1/31/20 1928)   ibuprofen (ADVIL/MOTRIN) tablet 600 mg (600 mg Oral Given 1/31/20 1744)   iopamidol (ISOVUE-370) solution 100 mL (83 mLs Intravenous Given 1/31/20 1950)   sodium chloride 0.9 % bag 100mL (61 mLs Intravenous Given 1/31/20 1951)   cefTRIAXone (ROCEPHIN) 1 g vial to attach to  mL bag for ADULTS or NS 50 mL bag for PEDS (0 g Intravenous Stopped 1/31/20 2139)   azithromycin (ZITHROMAX) 500 mg in sodium chloride 0.9 % 250 mL intermittent infusion (0 mg Intravenous Stopped 1/31/20 2253)   oseltamivir (TAMIFLU) capsule 75 mg (75 mg Oral Given 1/31/20 2249)   HYDROmorphone (PF) (DILAUDID) injection 0.5 mg (0.5 mg Intravenous Given 1/31/20 2245)        Drips running?  No    Home pump  No    Current LDAs  Peripheral IV 01/31/20 Left Upper forearm (Active)   Site Assessment WDL 1/31/2020  5:32 PM   Line Status Saline locked 1/31/2020  5:32 PM   Phlebitis Scale 0-->no symptoms 1/31/2020  5:32 PM   Infiltration Site Treatment Method  None 1/31/2020  5:32 PM   Extravasation? No 1/31/2020  5:32 PM   Dressing Intervention New dressing  1/31/2020  5:32 PM   Number of days: 0       Labs results:   Labs Ordered and Resulted from Time of ED Arrival Up to the Time of Departure from the ED   CBC WITH PLATELETS DIFFERENTIAL - Abnormal; Notable for the following components:       Result Value    Absolute Lymphocytes 0.5 (*)     All other components within normal limits   COMPREHENSIVE METABOLIC PANEL - Abnormal; Notable for the following components:    Creatinine 1.29 (*)     All other components within normal limits   CRP INFLAMMATION - Abnormal; Notable for the following components:    CRP Inflammation 47.0 (*)     All other components within normal limits   ROUTINE UA WITH MICROSCOPIC - Abnormal; Notable for the following components:    Ketones Urine 5 (*)     Specific Gravity Urine >1.035 (*)     Mucous Urine Present (*)     All other components within normal limits   D DIMER QUANTITATIVE - Abnormal; Notable for the following components:    D Dimer 0.7 (*)     All other components within normal limits   INFLUENZA A AND B AND RSV PCR - Abnormal; Notable for the following components:    Influenza A PCR Positive (*)     All other components within normal limits   LACTIC ACID WHOLE BLOOD   INR   TROPONIN I   NT PROBNP INPATIENT   PERIPHERAL IV CATHETER   INFLUENZA A/B ANTIGEN   BLOOD CULTURE       Imaging Studies:   Recent Results (from the past 24 hour(s))   XR Chest 2 Views    Narrative    XR CHEST TWO VIEWS   1/31/2020 6:25 PM     HISTORY: Chest pain, fever.    COMPARISON: 5/20/2019.      Impression    IMPRESSION: No new airspace disease identified. Suggestion of  some  bronchiectasis at the left lower lung that appears similar to the  prior exam. Normal cardiac silhouette.    OG BRANNON MD   CT Chest/Abdomen/Pelvis w Contrast    Narrative    CT CHEST/ABDOMEN/PELVIS WITH CONTRAST  1/31/2020 7:55 PM    HISTORY:  Fever right lower quadrant pain, cough, shortness of breath,  chest pain.    TECHNIQUE: CT scan obtained of the chest, abdomen, and pelvis with  oral and IV contrast. 83mL Isovue 370 IV injected. Radiation dose for  this scan was reduced using automated exposure control, adjustment of  the mA and/or kV according to patient size, or iterative  reconstruction technique.    COMPARISON:  Chest x-ray 1/31/2020.    FINDINGS:  Chest: No acute thoracic aortic abnormality. The exam is not tailored  for specific assessment of pulmonary embolism. No central large  pulmonary embolism is seen. The medium and small branch vessels cannot  be fully assessed. No effusions. No suspicious enlarged lymph nodes.    There are bilateral regions of moderate bronchiectasis, dominantly  seen at the left lower lobe inferiorly, right middle lobe, and also  mildly identified at the left upper lobe. There are areas of moderate  wall thickening of the bronchiectasis particularly at the left lower  lobe. Mild areas of wall thickening of the bronchiectasis at the right  middle lobe noted. A few nonspecific nodules are also seen that could  be mucus impaction, but these are indeterminant. An example is 0.9 cm  medial right lower lobe series 6 image 273. There are other examples  bilaterally.    Abdomen/pelvis: Liver, gallbladder, adrenals, spleen, pancreas, and  kidneys do not show any acute abnormalities. No abscess or free air.  Normal appendix. A few small bowel loops are mildly distended with  fluid and gas. This is not convincing for small-bowel obstruction. No  suspicious enlarged lymph nodes. Normal abdominal aorta. No  aggressive-appearing bony lesions seen in the chest, abdomen, or  pelvis.       Impression    IMPRESSION:  1. Bilateral regions of moderate bronchiectasis. There are some areas  of moderate bronchial wall thickening with mucus impaction that could  represent an infectious or inflammatory etiology.  2. A few small bowel loops are mildly distended with fluid and gas and  may relate to an ileus and/or enteritis.  3. A few indeterminate pulmonary nodules. Given the bronchiectasis and  areas of mucus impaction, some of the nodules may just be mucus  impaction. Suggest follow-up CT chest in 6 months for surveillance.    OG BRANNON MD       Recent vital signs:   /76   Pulse 123   Temp 102.3  F (39.1  C) (Oral)   Resp 16   Wt 76.7 kg (169 lb)   SpO2 92%   BMI 23.91 kg/m      Cylinder Coma Scale Score: 15 (01/31/20 1717)       Cardiac Rhythm: Normal Sinus  Pt needs tele? No  Skin/wound Issues: None    Code Status: Full Code    Pain control: fair    Nausea control: pt had none    Abnormal labs/tests/findings requiring intervention: see lab results    Family present during ED course? Yes   Family Comments/Social Situation comments: wife    Tasks needing completion: None    Jordyn Solo, HAYDE  [phone] [facility]  [phone] [facility]

## 2020-02-01 NOTE — PLAN OF CARE
Observation goals PRIOR TO DISCHARGE  0800 - 1200   Comments: -diagnostic tests and consults completed and resulted   -vital signs normal or at patient baseline /52 (BP Location: Left arm)   Pulse 101   Temp 102.4  F (39.1  C) (Oral)   Resp 16   SpO2 98%. Continues to have Elevated temp.   -tolerating oral intake to maintain hydration. PO fluid intake good.    -improvement in fever curve   Nurse to notify provider when observation goals have been met and patient is ready for discharge. Discussed with Dr. Gerardo. Patient will not discharge today.      Observation goals PRIOR TO DISCHARGE  1200 - 1500   Comments: -diagnostic tests and consults completed and resulted   -vital signs normal or at patient baseline /59 (BP Location: Right arm)   Pulse 90   Temp 99.7  F (37.6  C) (Oral)   Resp 16  SpO2 97%. Temp improving.   -tolerating oral intake to maintain hydration. PO fluid intake good.    -improvement in fever curve.   Nurse to notify provider when observation goals have been met and patient is ready for discharge. Discussed with Dr. Gerardo. Patient will not discharge today.

## 2020-02-01 NOTE — PROGRESS NOTES
RT NOTE:    Patient refused first dose. Followed up with patient and agreed to try albuterol nebulizer. Breath sounds were clear pre and post treatment. SPO2 100% on room air. Patient is breathing comfortably with no increase in WOB. Spoke with ordering provider Dr. Gerardo, orders obtained to discontinue order.     Alix Lora, RRT-NPS

## 2020-02-01 NOTE — H&P
[facility] [place]    History and Physical - Hospitalist Service       Date of Admission:  1/31/2020    Assessment & Plan   Paul Alcantara is a 35 year old male admitted on 1/31/2020 with influenza A and bronchiectasis.    #influenza A  #Bronchiectasis with few scattered pulmonary nodules- CT obtained due to symptoms and initial negative rapid flu demonstrated bronchiectasis.  Started on ceft/azith in ED.    - continue tamiflu, instructed significant other of patient to obtain Rx in AM  - add on procalcitonin, will use to guide antibiotics as he has classic influenza symptoms and has only been ill x24 hours making concomitant bacterial pneumonia less likely  - outpatient repeat CT recommended in 6 months for eval of bronchiectasis/nodules per radiology.    #abdominal pain - unclear eitology imaging, labs negative.  No red flag/warning symptoms  - CTM, further eval depending on symptoms    #DAVID - in setting of poor PO and influenza  - IVF overnight, avoid NSAIDs      Diet: as tolerated  DVT Prophylaxis: Low Risk/Ambulatory with no VTE prophylaxis indicated  He Catheter: not present  Code Status: full    Disposition Plan   Expected discharge: Tomorrow, recommended to prior living arrangement once adequate pain management/ tolerating PO medications and antibiotic plan established.  Entered: Yamil Gaines MD 02/01/2020, 12:15 AM     The patient's care was discussed with the Bedside Nurse and Patient.    Yamil Gaines MD  [facility] [place]    ______________________________________________________________________    Chief Complaint   influenza    History is obtained from the patient    History of Present Illness   Paul Alcantara is a 35 year old male who presents with 24 hours of rigors, fevers, cough, and chest congestion.  He notes that this feels exactly like his last episode of influenza a few years ago.  He reports his  symptoms started acutely.  He has had associated headache and poor po intake. No nausea or vomiting or diarrhea. Mild lower abdominal pain this afternoon that is stable.  No rashes, lumps/bumps.  No palpitations. Some shortness of breath and cough with think mucus.  No blurred vision or sore throat.  He denies other concerns.    Review of Systems    The 10 point Review of Systems is negative other than noted in the HPI or here.     Past Medical History    I have reviewed this patient's medical history and updated it with pertinent information if needed.   History of latent TB treated.  GERD    Past Surgical History   I have reviewed this patient's surgical history and updated it with pertinent information if needed.  History reviewed. No pertinent surgical history.    Social History   I have reviewed this patient's social history and updated it with pertinent information if needed.  Social History     Tobacco Use     Smoking status: Never Smoker     Smokeless tobacco: Never Used   Substance Use Topics     Alcohol use: No     Drug use: No   Lives at home with wife. [employer]  for occupation.    Family History   I have reviewed this patient's family history and updated it with pertinent information if needed.   Family History   Problem Relation Age of Onset     Family History Negative Mother      Family History Negative Father    Denies any family history of medical illness.    Prior to Admission Medications   Prior to Admission Medications   Prescriptions Last Dose Informant Patient Reported? Taking?   Menthol, Topical Analgesic, (BLUE GEL) 2 % GEL   No No   Sig: Externally apply topically 4 times daily as needed (RIB AND UPPER AND LOWER BACK AND NECKPAIN)   ibuprofen (ADVIL/MOTRIN) 600 MG tablet 1/31/2020 at Unknown time  No Yes   Sig: Take 1 tablet (600 mg) by mouth every 6 hours as needed for moderate pain   vitamin D3 (CHOLECALCIFEROL) 2000 units tablet   No No   Sig: Take 1 tablet by mouth daily   Patient not  taking: Reported on 5/20/2019      Facility-Administered Medications: None     Allergies   No Known Allergies    Physical Exam   Vital Signs: Temp: 99.4  F (37.4  C) Temp src: Oral BP: 127/78 Pulse: 105   Resp: 18 SpO2: 99 % O2 Device: None (Room air)    Weight: 169 lbs 0 oz    General Appearance: alert, ill appearing male, lying in bed, frequent cough  Eyes: perrl, eomi, no icterus  HEENT: dry mm, no rhinorrhea, no LAD  Respiratory: crackles bilaterally in bases, no wheeze, coarse upper airway conducted on L  Cardiovascular: rr no m/r/g  GI: s, nt, nd, bs present  Genitourinary: deferred  Skin: no rash  Musculoskeletal: no joint pain or change in ROM, no effusion.  Neurologic: LERNER, oriented x3, appropriately interactive  Psychiatric: normal affect    Data   Data reviewed today: I reviewed all medications, new labs and imaging results over the last 24 hours. I personally reviewed the chest CT image(s) showing bronchiectasis.

## 2020-02-01 NOTE — PLAN OF CARE
VS:     Pt A/O X 4. Temperature elevated in the AM but improved this afternoon. /59 (BP Location: Right arm)   Pulse 90   Temp 99.7  F (37.6  C) (Oral)   Resp 16  SpO2 97%. PRN Tylenol administered. Lungs- congested bilaterally. Productive cough present and patient continues to feel SOB. Nebs not helpful per RT. Denies nausea and chest pain.     Output:     Bowels- active in all four quadrants. Voids spontaneously without   difficulty in the bathroom.     Activity:     Pt up ad vikash.     Skin: Intact.     Pain:     Had headache this AM, but improved after Excederin.      CMS:     CMS and Neuro's are intact. Denies numbness and tingling in all extremities.      Diet:     Pt is on a Regular diet and appetite was good this shift.       LDA:     PIV is patent in the left hand and currently infusing LR at 150cc/hr.      Equipment:     IV pole.      Plan:     Pt is able to make needs known and the call light is within the pt's reach. Continue to monitor.
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
## 2020-02-01 NOTE — PROVIDER NOTIFICATION
Moonlighter was called and informed that pt's oral temperature is 101.6. Pt had taken Tylenol 650mg about 90 minutes earlier. Also, pt is complaining of painful urination. Writer applied cool washcloths to forehead and armpit areas. Pt also given 400mg Ibuprofen. Writer was instructed by phoenix to give PRN Tylenol when it is available and to monitor the pt.

## 2020-02-02 ENCOUNTER — PATIENT OUTREACH (OUTPATIENT)
Dept: CARE COORDINATION | Facility: CLINIC | Age: 35
End: 2020-02-02

## 2020-02-02 VITALS
TEMPERATURE: 98.4 F | BODY MASS INDEX: 23.91 KG/M2 | OXYGEN SATURATION: 98 % | WEIGHT: 169 LBS | HEART RATE: 90 BPM | RESPIRATION RATE: 18 BRPM | SYSTOLIC BLOOD PRESSURE: 114 MMHG | DIASTOLIC BLOOD PRESSURE: 60 MMHG

## 2020-02-02 PROCEDURE — 25800030 ZZH RX IP 258 OP 636: Performed by: INTERNAL MEDICINE

## 2020-02-02 PROCEDURE — 25000132 ZZH RX MED GY IP 250 OP 250 PS 637: Performed by: INTERNAL MEDICINE

## 2020-02-02 PROCEDURE — 25000132 ZZH RX MED GY IP 250 OP 250 PS 637: Performed by: STUDENT IN AN ORGANIZED HEALTH CARE EDUCATION/TRAINING PROGRAM

## 2020-02-02 PROCEDURE — 99217 ZZC OBSERVATION CARE DISCHARGE: CPT | Performed by: INTERNAL MEDICINE

## 2020-02-02 PROCEDURE — G0378 HOSPITAL OBSERVATION PER HR: HCPCS

## 2020-02-02 RX ORDER — LEVOFLOXACIN 500 MG/1
500 TABLET, FILM COATED ORAL DAILY
Qty: 4 TABLET | Refills: 0 | Status: SHIPPED | OUTPATIENT
Start: 2020-02-02 | End: 2020-02-02

## 2020-02-02 RX ORDER — SENNOSIDES 8.6 MG
8.6 TABLET ORAL 2 TIMES DAILY PRN
Status: DISCONTINUED | OUTPATIENT
Start: 2020-02-02 | End: 2020-02-02 | Stop reason: HOSPADM

## 2020-02-02 RX ORDER — OSELTAMIVIR PHOSPHATE 75 MG/1
75 CAPSULE ORAL 2 TIMES DAILY
Qty: 6 CAPSULE | Refills: 0 | Status: SHIPPED | OUTPATIENT
Start: 2020-02-02 | End: 2020-02-02

## 2020-02-02 RX ORDER — LEVOFLOXACIN 25 MG/ML
500 SOLUTION ORAL DAILY
Qty: 100 ML | Refills: 0 | Status: SHIPPED | OUTPATIENT
Start: 2020-02-02 | End: 2020-02-07

## 2020-02-02 RX ORDER — CODEINE PHOSPHATE AND GUAIFENESIN 10; 100 MG/5ML; MG/5ML
10 SOLUTION ORAL EVERY 4 HOURS PRN
Qty: 118 ML | Refills: 0 | Status: SHIPPED | OUTPATIENT
Start: 2020-02-02 | End: 2020-11-05

## 2020-02-02 RX ORDER — LEVOFLOXACIN 500 MG/1
500 TABLET, FILM COATED ORAL DAILY
Status: DISCONTINUED | OUTPATIENT
Start: 2020-02-02 | End: 2020-02-02 | Stop reason: HOSPADM

## 2020-02-02 RX ORDER — POLYETHYLENE GLYCOL 3350 17 G/17G
17 POWDER, FOR SOLUTION ORAL DAILY
Status: DISCONTINUED | OUTPATIENT
Start: 2020-02-02 | End: 2020-02-02 | Stop reason: HOSPADM

## 2020-02-02 RX ORDER — ALBUTEROL SULFATE 90 UG/1
2 AEROSOL, METERED RESPIRATORY (INHALATION) EVERY 6 HOURS PRN
Status: DISCONTINUED | OUTPATIENT
Start: 2020-02-02 | End: 2020-02-02 | Stop reason: HOSPADM

## 2020-02-02 RX ORDER — ALBUTEROL SULFATE 90 UG/1
2 AEROSOL, METERED RESPIRATORY (INHALATION) EVERY 6 HOURS PRN
Qty: 1 INHALER | Refills: 0 | Status: SHIPPED | OUTPATIENT
Start: 2020-02-02 | End: 2020-11-05

## 2020-02-02 RX ORDER — OSELTAMIVIR PHOSPHATE 75 MG/1
75 CAPSULE ORAL 2 TIMES DAILY
Qty: 6 CAPSULE | Refills: 0 | Status: SHIPPED | OUTPATIENT
Start: 2020-02-02 | End: 2020-11-05

## 2020-02-02 RX ADMIN — ACETAMINOPHEN 650 MG: 325 SOLUTION ORAL at 00:06

## 2020-02-02 RX ADMIN — SODIUM CHLORIDE, POTASSIUM CHLORIDE, SODIUM LACTATE AND CALCIUM CHLORIDE: 600; 310; 30; 20 INJECTION, SOLUTION INTRAVENOUS at 02:56

## 2020-02-02 RX ADMIN — POLYETHYLENE GLYCOL 3350 17 G: 17 POWDER, FOR SOLUTION ORAL at 08:35

## 2020-02-02 RX ADMIN — GUAIFENESIN AND CODEINE PHOSPHATE 10 ML: 10; 100 LIQUID ORAL at 08:35

## 2020-02-02 RX ADMIN — LEVOFLOXACIN 500 MG: 500 TABLET, FILM COATED ORAL at 09:41

## 2020-02-02 RX ADMIN — OSELTAMIVIR PHOSPHATE 75 MG: 75 CAPSULE ORAL at 08:35
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
## 2020-02-02 NOTE — PLAN OF CARE
VS: Stable.   O2: RA, crackles.   Output: Pt reports painful urination, moonlighter aware.   Last BM: 1/29/20, pt reports feeling constipated, moonlighter paged, bowel meds ordered.   Activity: Independent.   Skin: WDL.   Pain: Denies.   CMS: Intact.   Dressing: NA.   Diet: Regular.   LDA: PIV left upper forearm infusing LR at 150 ml/hr.   Equipment: IV pole, call light within reach.   Plan: Continue to monitor.   Additional Info:       OBS goals:  -diagnostic tests and consults completed and resulted   -vital signs normal or at patient baseline   -tolerating oral intake to maintain hydration   -improvement in fever curve     8411-4059: Pt awake in bed, tylenol given  4225-1297: VSS, snack given, pt tolerating oral intake  5165-9397: pt declined tylenol and ibuprofen, sleeping  1377-5368: VSS, pt sleeping

## 2020-02-02 NOTE — PLAN OF CARE
VS: VSS ex temp of 99.9   O2: Stable on RA.    Output: Voiding spontaneously and without difficulty   Last BM: 1/29/2020   Activity: Independent   Skin: WDL   Pain: Tylenol and ibuprofen    CMS: Intact    Dressing: N/a   Diet: Regular    LDA: PIV Infusing LR at 150ml/hr   Plan: TBD      [id number]  -diagnostic tests and consults completed and resulted: Awaiting blood cultures   -vital signs normal or at patient baseline: Elevated temp   -tolerating oral intake to maintain hydration: tolerating oral intake, pt prefers to take liquid medication   -improvement in fever curve: Pt still has elevated temp       [id number]  -diagnostic tests and consults completed and resulted: Awaiting blood cultures   -vital signs normal or at patient baseline: Elevated Temp    -tolerating oral intake to maintain hydration: tolerating oral intake, pt prefers to take liquid medication  -improvement in fever: Tylenol and ibuprofen given

## 2020-02-04 NOTE — TELEPHONE ENCOUNTER
Patient was called three times and no answer so post 24 hr DC follow up calls will be closed out    Luna Da Silva CMA   Post Hospital Discharge Team

## 2020-02-06 LAB
BACTERIA SPEC CULT: NO GROWTH
SPECIMEN SOURCE: NORMAL

## 2020-02-25 DIAGNOSIS — K21.9 GASTROESOPHAGEAL REFLUX DISEASE WITHOUT ESOPHAGITIS: Primary | ICD-10-CM

## 2020-02-25 RX ORDER — OMEPRAZOLE 20 MG/1
20 TABLET, DELAYED RELEASE ORAL DAILY
OUTPATIENT
Start: 2020-02-25

## 2020-02-25 NOTE — TELEPHONE ENCOUNTER
omeprazole (PRILOSEC OTC) 20 MG EC tablet  DISCONTINUED      Last Written Prescription Date:  4/12/2018 END: 3/4/2019  Last Fill Quantity: 30 tablet,  # refills: 11   Last office visit: 5/20/2019 with prescribing provider:  BRIAN Terry   Future Office Visit:      Routing refill request to provider for review/approval because:  Drug not on the [facility] or [facility] refill protocol or controlled substance  Drug not active on patient's medication list

## 2020-10-30 DIAGNOSIS — E55.9 VITAMIN D INSUFFICIENCY: ICD-10-CM

## 2020-10-30 RX ORDER — CHOLECALCIFEROL (VITAMIN D3) 50 MCG
50 TABLET ORAL DAILY
Qty: 100 TABLET | Refills: 11 | Status: SHIPPED | OUTPATIENT
Start: 2020-10-30 | End: 2020-11-05

## 2020-10-30 NOTE — TELEPHONE ENCOUNTER
Patient Contact    Called patient and notified of his need for an appointment to establish care. He states he is on the road right now and declines to schedule. Advised to look at [facility] website to find location close to his home.     Routing refill request to provider for review/approval because:  Patient needs to be seen because it has been more than 1 year since last office visit.  PCP has retired, covering team to review/advise.

## 2020-11-05 ENCOUNTER — OFFICE VISIT (OUTPATIENT)
Dept: FAMILY MEDICINE | Facility: CLINIC | Age: 35
End: 2020-11-05
Payer: COMMERCIAL

## 2020-11-05 VITALS
BODY MASS INDEX: 19.78 KG/M2 | RESPIRATION RATE: 14 BRPM | HEIGHT: 78 IN | WEIGHT: 171 LBS | HEART RATE: 74 BPM | SYSTOLIC BLOOD PRESSURE: 118 MMHG | OXYGEN SATURATION: 97 % | DIASTOLIC BLOOD PRESSURE: 74 MMHG | TEMPERATURE: 98.6 F

## 2020-11-05 DIAGNOSIS — R30.0 DYSURIA: ICD-10-CM

## 2020-11-05 DIAGNOSIS — E55.9 VITAMIN D INSUFFICIENCY: ICD-10-CM

## 2020-11-05 DIAGNOSIS — M54.50 ACUTE BILATERAL LOW BACK PAIN WITHOUT SCIATICA: Primary | ICD-10-CM

## 2020-11-05 LAB
ALBUMIN UR-MCNC: NEGATIVE MG/DL
APPEARANCE UR: CLEAR
BILIRUB UR QL STRIP: NEGATIVE
COLOR UR AUTO: YELLOW
GLUCOSE UR STRIP-MCNC: NEGATIVE MG/DL
HGB UR QL STRIP: NEGATIVE
KETONES UR STRIP-MCNC: NEGATIVE MG/DL
LEUKOCYTE ESTERASE UR QL STRIP: NEGATIVE
NITRATE UR QL: NEGATIVE
PH UR STRIP: 5 PH (ref 5–7)
SOURCE: NORMAL
SP GR UR STRIP: >1.03 (ref 1–1.03)
UROBILINOGEN UR STRIP-ACNC: 0.2 EU/DL (ref 0.2–1)

## 2020-11-05 PROCEDURE — 99214 OFFICE O/P EST MOD 30 MIN: CPT | Performed by: FAMILY MEDICINE

## 2020-11-05 PROCEDURE — 81003 URINALYSIS AUTO W/O SCOPE: CPT | Performed by: FAMILY MEDICINE

## 2020-11-05 RX ORDER — METHYLPREDNISOLONE 4 MG
TABLET, DOSE PACK ORAL
Qty: 21 TABLET | Refills: 0 | Status: SHIPPED | OUTPATIENT
Start: 2020-11-05 | End: 2021-01-14

## 2020-11-05 RX ORDER — CHOLECALCIFEROL (VITAMIN D3) 50 MCG
50 TABLET ORAL DAILY
Qty: 100 TABLET | Refills: 11 | Status: SHIPPED | OUTPATIENT
Start: 2020-11-05

## 2020-11-05 ASSESSMENT — MIFFLIN-ST. JEOR: SCORE: 1937.8

## 2020-11-05 NOTE — PROGRESS NOTES
Subjective     Paul Alcantara is a 35 year old male who presents to clinic today for the following health issues:    Pt here with his wife.  She interprets some for him as needed    HPI         Back Pain  Onset/Duration: X3 weeks  Description:   Location of pain: low back bilateral  Character of pain: sharp and burning  Pain radiation: radiates into the right leg, radiates into the right foot, radiates into the left leg and radiates into the left foot  New numbness or weakness in legs, not attributed to pain: NO  Intensity: Currently 9/10  Progression of Symptoms: intermittent  History:   Specific cause: none  Pain interferes with job: no  History of back problems: no prior back problems  Any previous MRI or X-rays: None  Sees a specialist for back pain: No  Alleviating factors:   Improved by: heat    Precipitating factors:  Worsened by: Sitting and Walking  Therapies tried and outcome: heat  Wrist Pain X1 months  Accompanying Signs & Symptoms:  Risk of Fracture: None  Risk of Cauda Equina: None  Risk of Infection: None  Risk of Cancer: None  Risk of Ankylosing Spondylitis: Onset at age <35, male, AND morning back stiffness no        Concern - Kidney Pain  Onset: X1 week  Description: burning sensation with urination, painful ejaculation  Intensity: moderate  Progression of Symptoms:  intermittent  Accompanying Signs & Symptoms: None    Does not happen every time   No discharge        Review of Systems   CONSTITUTIONAL: NEGATIVE for fever, chills, change in weight  ENT/MOUTH: NEGATIVE for ear, mouth and throat problems  RESP: NEGATIVE for significant cough or SOB  CV: NEGATIVE for chest pain, palpitations or peripheral edema  : positive for and dysuria  MUSCULOSKELETAL: POSITIVE  for arthralgias wrists and back pain low back      Objective    /74 (BP Location: Right arm, Patient Position: Sitting, Cuff Size: Adult Regular)   Pulse 74   Temp 98.6  F (37  C) (Tympanic)   Resp 14   Ht 2.123 m (6'  "11.6\")   Wt 77.6 kg (171 lb)   SpO2 97%   BMI 17.20 kg/m    Body mass index is 17.2 kg/m .  Physical Exam   GENERAL: healthy, alert and no distress  NECK: no adenopathy, no asymmetry, masses, or scars and thyroid normal to palpation  RESP: lungs clear to auscultation - no rales, rhonchi or wheezes  CV: regular rate and rhythm, normal S1 S2, no S3 or S4, no murmur, click or rub, no peripheral edema and peripheral pulses strong  ABDOMEN: soft, nontender, no hepatosplenomegaly, no masses and bowel sounds normal   (male): testicles normal without atrophy or masses, no hernias and penis normal without urethral discharge  MS: RUE exam shows no deformities and ROM of all joints is normal, LUE exam shows no deformities and ROM of all joints is normal and spine exam shows no scoliosis and tenderness low back at pelvic brim.  No spasm  No CVA tenderness    Lab: see below, results pending        Assessment & Plan     Paul was seen today for back pain, musculoskeletal problem and pain.    Diagnoses and all orders for this visit:    Acute bilateral low back pain without sciatica  -     methylPREDNISolone (MEDROL DOSEPAK) 4 MG tablet therapy pack; Follow Package Directions    Vitamin D insufficiency  -     vitamin D3 (CHOLECALCIFEROL) 50 mcg (2000 units) tablet; Take 1 tablet (50 mcg) by mouth daily    Dysuria  -     *UA reflex to Microscopic and Culture ([place] and [facility] (except [place] and [place])            FUTURE APPOINTMENTS:       - Follow-up visit in 2 weeks if not resolving  Patient Instructions   Alternate ice & heat.  Use Ice massage on trigger point areas.  Do gentle Range of motion exercises  Push fluids      Return in about 2 weeks (around 11/19/2020), or if symptoms worsen or fail to improve, for low back pain.    Edgar Padilla MD  [facility]    "

## 2020-11-05 NOTE — PATIENT INSTRUCTIONS
Alternate ice & heat.  Use Ice massage on trigger point areas.  Do gentle Range of motion exercises  Push fluids

## 2021-01-14 ENCOUNTER — OFFICE VISIT (OUTPATIENT)
Dept: INTERNAL MEDICINE | Facility: CLINIC | Age: 36
End: 2021-01-14
Payer: COMMERCIAL

## 2021-01-14 ENCOUNTER — ANCILLARY PROCEDURE (OUTPATIENT)
Dept: GENERAL RADIOLOGY | Facility: CLINIC | Age: 36
End: 2021-01-14
Attending: INTERNAL MEDICINE
Payer: COMMERCIAL

## 2021-01-14 VITALS
SYSTOLIC BLOOD PRESSURE: 104 MMHG | OXYGEN SATURATION: 99 % | HEART RATE: 68 BPM | TEMPERATURE: 97.3 F | RESPIRATION RATE: 16 BRPM | BODY MASS INDEX: 16.8 KG/M2 | WEIGHT: 167 LBS | DIASTOLIC BLOOD PRESSURE: 72 MMHG

## 2021-01-14 DIAGNOSIS — K21.9 GASTROESOPHAGEAL REFLUX DISEASE WITHOUT ESOPHAGITIS: ICD-10-CM

## 2021-01-14 DIAGNOSIS — G89.29 CHRONIC PAIN OF BOTH KNEES: ICD-10-CM

## 2021-01-14 DIAGNOSIS — N53.12 PAINFUL EJACULATION: ICD-10-CM

## 2021-01-14 DIAGNOSIS — M25.562 CHRONIC PAIN OF BOTH KNEES: ICD-10-CM

## 2021-01-14 DIAGNOSIS — M25.512 ACUTE PAIN OF LEFT SHOULDER: ICD-10-CM

## 2021-01-14 DIAGNOSIS — R10.9 STOMACH PAIN: Primary | ICD-10-CM

## 2021-01-14 DIAGNOSIS — M25.561 CHRONIC PAIN OF BOTH KNEES: ICD-10-CM

## 2021-01-14 PROBLEM — Z22.7 LATENT TUBERCULOSIS: Status: ACTIVE | Noted: 2017-02-14

## 2021-01-14 LAB
ALBUMIN SERPL-MCNC: 3.9 G/DL (ref 3.4–5)
ALP SERPL-CCNC: 130 U/L (ref 40–150)
ALT SERPL W P-5'-P-CCNC: 26 U/L (ref 0–70)
ANION GAP SERPL CALCULATED.3IONS-SCNC: 5 MMOL/L (ref 3–14)
AST SERPL W P-5'-P-CCNC: 17 U/L (ref 0–45)
BILIRUB SERPL-MCNC: 0.5 MG/DL (ref 0.2–1.3)
BUN SERPL-MCNC: 14 MG/DL (ref 7–30)
CALCIUM SERPL-MCNC: 9.1 MG/DL (ref 8.5–10.1)
CHLORIDE SERPL-SCNC: 107 MMOL/L (ref 94–109)
CO2 SERPL-SCNC: 27 MMOL/L (ref 20–32)
CREAT SERPL-MCNC: 1.1 MG/DL (ref 0.66–1.25)
ERYTHROCYTE [DISTWIDTH] IN BLOOD BY AUTOMATED COUNT: 13.1 % (ref 10–15)
GFR SERPL CREATININE-BSD FRML MDRD: 86 ML/MIN/{1.73_M2}
GLUCOSE SERPL-MCNC: 94 MG/DL (ref 70–99)
HCT VFR BLD AUTO: 46.4 % (ref 40–53)
HGB BLD-MCNC: 15.7 G/DL (ref 13.3–17.7)
MCH RBC QN AUTO: 29.3 PG (ref 26.5–33)
MCHC RBC AUTO-ENTMCNC: 33.8 G/DL (ref 31.5–36.5)
MCV RBC AUTO: 87 FL (ref 78–100)
PLATELET # BLD AUTO: 273 10E9/L (ref 150–450)
POTASSIUM SERPL-SCNC: 4.3 MMOL/L (ref 3.4–5.3)
PROT SERPL-MCNC: 7.9 G/DL (ref 6.8–8.8)
RBC # BLD AUTO: 5.35 10E12/L (ref 4.4–5.9)
SODIUM SERPL-SCNC: 139 MMOL/L (ref 133–144)
WBC # BLD AUTO: 4 10E9/L (ref 4–11)

## 2021-01-14 PROCEDURE — 36415 COLL VENOUS BLD VENIPUNCTURE: CPT | Performed by: INTERNAL MEDICINE

## 2021-01-14 PROCEDURE — 73030 X-RAY EXAM OF SHOULDER: CPT | Mod: LT | Performed by: RADIOLOGY

## 2021-01-14 PROCEDURE — 99214 OFFICE O/P EST MOD 30 MIN: CPT | Performed by: INTERNAL MEDICINE

## 2021-01-14 PROCEDURE — 80053 COMPREHEN METABOLIC PANEL: CPT | Performed by: INTERNAL MEDICINE

## 2021-01-14 PROCEDURE — 85027 COMPLETE CBC AUTOMATED: CPT | Performed by: INTERNAL MEDICINE

## 2021-01-14 RX ORDER — MECOBALAMIN 5000 MCG
15 TABLET,DISINTEGRATING ORAL DAILY
Qty: 60 CAPSULE | Refills: 1 | Status: CANCELLED | OUTPATIENT
Start: 2021-01-14

## 2021-01-14 NOTE — PROGRESS NOTES
Assessment & Plan     Stomach pain  Gastroesophageal reflux disease without esophagitis  Persistent despite PPI therapy. He was agreeable to an EGD. Considered stool H pylori testing but false negative potential given he's been on PPI and would expect they'll biopsy him for it during EGD. Lab work sent. Instructed him to double PPI to omeprazole 40mg BID in the interim. Avoid NSAIDs.  - CBC with platelets  - Comprehensive metabolic panel  - GASTROENTEROLOGY ADULT REF PROCEDURE ONLY; Future  - diclofenac (VOLTAREN) 1 % topical gel; Apply 2 g topically 4 times daily    Acute pain of left shoulder  Some mild weakness on exam. If L shoulder XR does not show anything, I would move forward with a MRI. PT referral as well. Voltaren gel as below. Tylenol also okay.  - PHYSICAL THERAPY REFERRAL; Future  - XR Shoulder Left 2 Views; Future    Chronic pain of both knees  OA high on DDX. Knee exam reassuring. NSAIDs contraindicated given concern for PUD.  - PHYSICAL THERAPY REFERRAL; Future  - diclofenac (VOLTAREN) 1 % topical gel; Apply 2 g topically 4 times daily    Painful ejaculation  Discussed recommendation for STD testing but he declined today. Discussed possibly re-doing his U/A but he felt if it was normal a few months ago then it'll be normal now. He preferred just to have a urology referral which I did place.  - UROLOGY ADULT REFERRAL; Future    Return in about 3 months (around 4/14/2021) for Physical Exam.    Freddy Crowder MD  [facility] 36 [facility] who presents to clinic today for the following health issues:    HPI   Musculoskeletal problem/pain  Onset/Duration: 2 months  Description  Location: Shoulder - left, bilateral knee  Joint Swelling: no  Redness: no  Pain: YES  Warmth: no  Intensity:  moderate, severe  Progression of Symptoms:  same  Accompanying signs and symptoms:   Fevers: no  Numbness/tingling/weakness: YES- sometimes  History  Trauma  to the area: no  Recent illness:  no  Previous similar problem: had an accident a few years ago, not severe  Previous evaluation:  no  Precipitating or alleviating factors:  Aggravating factors include: overuse  Therapies tried and outcome: nothing    Paul presents for the following complaints:    -L shoulder pain: Paul reports falling about 2 months ago and hurting his shoulder at that time. No dislocation that he can recall. It has been sore since then. No weakness. No numbness. No tingling. Never had this before.    -Bilateral knee pain: Both knees 'crack' and are painful when he uses them for awhile. No weakness. No catching sensation. Never give out on him. Can bear weight normally.    -Painful ejaculation: Has noticed this for the last 5 months. Had a U/A in November that was normal. He declines further urine tests, including STD tests. No penile lesions. No discharge.    -Stomach pain: His wife is quite concerned that he has an ulcer 'because he is [place] and everyone in [facility] has an ulcer' according to her report. He's been taking omeprazole 40mg daily and it hasn't helped. No dysphagia. No weight loss. No blood in stool. No regurgitation of liquids or foods after swallowing them.    Review of Systems   Constitutional, HEENT, cardiovascular, pulmonary, gi and gu systems are negative, except as otherwise noted.      Objective    /72 (BP Location: Left arm, Patient Position: Chair, Cuff Size: Adult Regular)   Pulse 68   Temp 97.3  F (36.3  C) (Temporal)   Resp 16   Wt 75.8 kg (167 lb)   SpO2 99%   BMI 16.80 kg/m    Body mass index is 16.8 kg/m .  Physical Exam   GENERAL: alert and in no distress.  EYES: conjunctivae/corneas clear. EOMs grossly intact  HENT: NC/AT, facies symmetric. Neck supple. No cervical LAD appreciated.  RESP: CTAB, no w/r/r.  CV: RRR, no m/r/g.  GI: NT, ND, without rebound tenderness or guarding  MSK: No edema. No cyanosis or clubbing noted bilaterally in upper and/or  lower extremities.  R shoulder WNL. L shoulder with some mild scapular winging. Mild pain with palpation over the anterior and posterior joint areas. Strength 5/5 with adduction, abduction, and extension. 4.5/5 with flexion. No gross sensory deficits. ROM WNL. Empty can test negative. Subscapularis lift off test shows weakness. Hawkin's negative for impingement.  SKIN: No significant ulcers, lesions, or rashes on the visualized portions of the skin  NEURO: Alert. Oriented. Sensation grossly WNL.

## 2021-01-14 NOTE — PATIENT INSTRUCTIONS
- You can increase the omeprazole to 40mg twice a day  - Referral placed for stomach scope  - Urology referral placed  - Xrays today  - Blood work today  - Cream for your knees prescribed  - Avoid medications like ibuprofen or Aleve

## 2021-04-14 ENCOUNTER — TELEPHONE (OUTPATIENT)
Facility: CLINIC | Age: 36
End: 2021-04-14

## 2021-04-14 NOTE — TELEPHONE ENCOUNTER
----- Message from Hayder Stewart MD sent at 4/14/2021  8:42 AM CDT -----  Regarding: in person consult  This visit should be in person not virtual for painful ejaculation    Thanks    Hayder Stewart MD

## 2021-04-30 DIAGNOSIS — N53.12 PAINFUL EJACULATION: Primary | ICD-10-CM

## 2021-09-14 ENCOUNTER — HOSPITAL ENCOUNTER (EMERGENCY)
Facility: CLINIC | Age: 36
Discharge: HOME | End: 2021-09-14
Attending: EMERGENCY MEDICINE | Admitting: EMERGENCY MEDICINE
Payer: COMMERCIAL

## 2021-09-14 VITALS
RESPIRATION RATE: 20 BRPM | TEMPERATURE: 100.2 F | SYSTOLIC BLOOD PRESSURE: 115 MMHG | BODY MASS INDEX: 18.71 KG/M2 | HEART RATE: 85 BPM | DIASTOLIC BLOOD PRESSURE: 73 MMHG | OXYGEN SATURATION: 97 % | WEIGHT: 186 LBS

## 2021-09-14 DIAGNOSIS — R50.9 FEVER, UNSPECIFIED FEVER CAUSE: ICD-10-CM

## 2021-09-14 DIAGNOSIS — U07.1 INFECTION DUE TO 2019 NOVEL CORONAVIRUS: ICD-10-CM

## 2021-09-14 DIAGNOSIS — R51.9 ACUTE NONINTRACTABLE HEADACHE, UNSPECIFIED HEADACHE TYPE: ICD-10-CM

## 2021-09-14 LAB
ALBUMIN SERPL-MCNC: 4 G/DL (ref 3.4–5)
ALP SERPL-CCNC: 128 U/L (ref 40–150)
ALT SERPL W P-5'-P-CCNC: 33 U/L (ref 0–70)
ANION GAP SERPL CALCULATED.3IONS-SCNC: 4 MMOL/L (ref 3–14)
AST SERPL W P-5'-P-CCNC: 26 U/L (ref 0–45)
BASOPHILS # BLD AUTO: 0 10E3/UL (ref 0–0.2)
BASOPHILS NFR BLD AUTO: 0 %
BILIRUB SERPL-MCNC: 0.5 MG/DL (ref 0.2–1.3)
BUN SERPL-MCNC: 12 MG/DL (ref 7–30)
CALCIUM SERPL-MCNC: 9.1 MG/DL (ref 8.5–10.1)
CHLORIDE BLD-SCNC: 104 MMOL/L (ref 94–109)
CO2 SERPL-SCNC: 28 MMOL/L (ref 20–32)
CREAT SERPL-MCNC: 1.31 MG/DL (ref 0.66–1.25)
EOSINOPHIL # BLD AUTO: 0.1 10E3/UL (ref 0–0.7)
EOSINOPHIL NFR BLD AUTO: 1 %
ERYTHROCYTE [DISTWIDTH] IN BLOOD BY AUTOMATED COUNT: 12.3 % (ref 10–15)
GFR SERPL CREATININE-BSD FRML MDRD: 70 ML/MIN/1.73M2
GLUCOSE BLD-MCNC: 81 MG/DL (ref 70–99)
HCT VFR BLD AUTO: 46.7 % (ref 40–53)
HGB BLD-MCNC: 15.9 G/DL (ref 13.3–17.7)
IMM GRANULOCYTES # BLD: 0 10E3/UL
IMM GRANULOCYTES NFR BLD: 0 %
LACTATE SERPL-SCNC: 1.2 MMOL/L (ref 0.7–2)
LIPASE SERPL-CCNC: 183 U/L (ref 73–393)
LYMPHOCYTES # BLD AUTO: 0.8 10E3/UL (ref 0.8–5.3)
LYMPHOCYTES NFR BLD AUTO: 13 %
MCH RBC QN AUTO: 28.6 PG (ref 26.5–33)
MCHC RBC AUTO-ENTMCNC: 34 G/DL (ref 31.5–36.5)
MCV RBC AUTO: 84 FL (ref 78–100)
MONOCYTES # BLD AUTO: 0.9 10E3/UL (ref 0–1.3)
MONOCYTES NFR BLD AUTO: 14 %
NEUTROPHILS # BLD AUTO: 4.4 10E3/UL (ref 1.6–8.3)
NEUTROPHILS NFR BLD AUTO: 72 %
NRBC # BLD AUTO: 0 10E3/UL
NRBC BLD AUTO-RTO: 0 /100
PLATELET # BLD AUTO: 214 10E3/UL (ref 150–450)
POTASSIUM BLD-SCNC: 3.8 MMOL/L (ref 3.4–5.3)
PROT SERPL-MCNC: 8.4 G/DL (ref 6.8–8.8)
RBC # BLD AUTO: 5.55 10E6/UL (ref 4.4–5.9)
SARS-COV-2 RNA RESP QL NAA+PROBE: POSITIVE
SODIUM SERPL-SCNC: 136 MMOL/L (ref 133–144)
WBC # BLD AUTO: 6.2 10E3/UL (ref 4–11)

## 2021-09-14 PROCEDURE — 85025 COMPLETE CBC W/AUTO DIFF WBC: CPT | Performed by: EMERGENCY MEDICINE

## 2021-09-14 PROCEDURE — 99284 EMERGENCY DEPT VISIT MOD MDM: CPT | Mod: 25

## 2021-09-14 PROCEDURE — 80053 COMPREHEN METABOLIC PANEL: CPT | Performed by: EMERGENCY MEDICINE

## 2021-09-14 PROCEDURE — 99284 EMERGENCY DEPT VISIT MOD MDM: CPT | Performed by: EMERGENCY MEDICINE

## 2021-09-14 PROCEDURE — 96361 HYDRATE IV INFUSION ADD-ON: CPT

## 2021-09-14 PROCEDURE — 83605 ASSAY OF LACTIC ACID: CPT | Performed by: EMERGENCY MEDICINE

## 2021-09-14 PROCEDURE — 83690 ASSAY OF LIPASE: CPT | Performed by: EMERGENCY MEDICINE

## 2021-09-14 PROCEDURE — C9803 HOPD COVID-19 SPEC COLLECT: HCPCS

## 2021-09-14 PROCEDURE — 96360 HYDRATION IV INFUSION INIT: CPT

## 2021-09-14 PROCEDURE — 258N000003 HC RX IP 258 OP 636: Performed by: EMERGENCY MEDICINE

## 2021-09-14 PROCEDURE — 250N000013 HC RX MED GY IP 250 OP 250 PS 637: Performed by: EMERGENCY MEDICINE

## 2021-09-14 PROCEDURE — 36415 COLL VENOUS BLD VENIPUNCTURE: CPT | Performed by: EMERGENCY MEDICINE

## 2021-09-14 PROCEDURE — U0005 INFEC AGEN DETEC AMPLI PROBE: HCPCS | Performed by: EMERGENCY MEDICINE

## 2021-09-14 RX ORDER — IBUPROFEN 600 MG/1
600 TABLET, FILM COATED ORAL EVERY 8 HOURS PRN
Qty: 20 TABLET | Refills: 0 | Status: SHIPPED | OUTPATIENT
Start: 2021-09-14

## 2021-09-14 RX ORDER — IBUPROFEN 600 MG/1
600 TABLET, FILM COATED ORAL ONCE
Status: COMPLETED | OUTPATIENT
Start: 2021-09-14 | End: 2021-09-14

## 2021-09-14 RX ORDER — ACETAMINOPHEN 325 MG/1
975 TABLET ORAL ONCE
Status: COMPLETED | OUTPATIENT
Start: 2021-09-14 | End: 2021-09-14

## 2021-09-14 RX ORDER — SODIUM CHLORIDE, SODIUM LACTATE, POTASSIUM CHLORIDE, CALCIUM CHLORIDE 600; 310; 30; 20 MG/100ML; MG/100ML; MG/100ML; MG/100ML
INJECTION, SOLUTION INTRAVENOUS CONTINUOUS
Status: DISCONTINUED | OUTPATIENT
Start: 2021-09-14 | End: 2021-09-14 | Stop reason: HOSPADM

## 2021-09-14 RX ORDER — ACETAMINOPHEN 500 MG
500-1000 TABLET ORAL EVERY 6 HOURS PRN
Qty: 60 TABLET | Refills: 0 | Status: SHIPPED | OUTPATIENT
Start: 2021-09-14

## 2021-09-14 RX ADMIN — SODIUM CHLORIDE, POTASSIUM CHLORIDE, SODIUM LACTATE AND CALCIUM CHLORIDE 1000 ML: 600; 310; 30; 20 INJECTION, SOLUTION INTRAVENOUS at 11:52

## 2021-09-14 RX ADMIN — IBUPROFEN 600 MG: 600 TABLET ORAL at 11:52

## 2021-09-14 RX ADMIN — SODIUM CHLORIDE, POTASSIUM CHLORIDE, SODIUM LACTATE AND CALCIUM CHLORIDE: 600; 310; 30; 20 INJECTION, SOLUTION INTRAVENOUS at 14:33

## 2021-09-14 RX ADMIN — ACETAMINOPHEN 975 MG: 325 TABLET, FILM COATED ORAL at 11:52

## 2021-09-14 RX ADMIN — SODIUM CHLORIDE, POTASSIUM CHLORIDE, SODIUM LACTATE AND CALCIUM CHLORIDE 1000 ML: 600; 310; 30; 20 INJECTION, SOLUTION INTRAVENOUS at 13:11

## 2021-09-14 ASSESSMENT — ENCOUNTER SYMPTOMS
FEVER: 1
MUSCULOSKELETAL NEGATIVE: 1
HEADACHES: 1
CARDIOVASCULAR NEGATIVE: 1
RESPIRATORY NEGATIVE: 1
GASTROINTESTINAL NEGATIVE: 1

## 2021-09-14 NOTE — ED PROVIDER NOTES
ED Provider Note  [facility] [place]      History     Chief Complaint   Patient presents with     Headache     ongoing headache since saturday     Fever     T-101.6,Subjective fever since saturday     The history is provided by the patient and a relative.     Paul Alcantara is a 36 year old male who is not COVID vaccinated. Patient is here with fever, headache, and malaise symptoms that have been going on for 3 days.  Patient does not have cough, has no recent travel, no abdominal pain ,and no rashes. Patient has not been knowingly around anyone that was sick or had COVID-19.  He has not tried any medication at home to help with the fever.  His mother reports that he had similar episodes when presented to an outside hospital in the past and he was dehydrated. They gave him IV fluids and he felt better.  This part of the medical record was transcribed by Chad Acosta Medical Scribe, from a dictation done by Hill Morgan MD.       Past Medical History  History reviewed. No pertinent past medical history.  History reviewed. No pertinent surgical history.  acetaminophen (TYLENOL) 500 MG tablet  diclofenac (VOLTAREN) 1 % topical gel  ibuprofen (ADVIL/MOTRIN) 600 MG tablet  vitamin D3 (CHOLECALCIFEROL) 50 mcg (2000 units) tablet      No Known Allergies  Family History  Family History   Problem Relation Age of Onset     Family History Negative Mother      Family History Negative Father      Social History   Social History     Tobacco Use     Smoking status: Never Smoker     Smokeless tobacco: Never Used   Substance Use Topics     Alcohol use: No     Drug use: No      Past medical history, past surgical history, medications, allergies, family history, and social history were reviewed with the patient. No additional pertinent items.       Review of Systems   Constitutional: Positive for fever.        Malaise   HENT: Negative.    Respiratory: Negative.    Cardiovascular: Negative.     Gastrointestinal: Negative.    Musculoskeletal: Negative.    Neurological: Positive for headaches.   All other systems reviewed and are negative.    A complete review of systems was performed with pertinent positives and negatives noted in the HPI, and all other systems negative.    Physical Exam   BP: 121/74  Pulse: 111  Temp: (!) 101.6  F (38.7  C)  Resp: 14  Weight: 84.4 kg (186 lb)  SpO2: 97 %  Physical Exam  Vitals and nursing note reviewed.   Constitutional:       General: He is not in acute distress.     Appearance: He is well-developed.   HENT:      Head: Normocephalic and atraumatic.      Mouth/Throat:      Mouth: Mucous membranes are moist.   Eyes:      General: No scleral icterus.     Conjunctiva/sclera: Conjunctivae normal.      Pupils: Pupils are equal, round, and reactive to light.   Cardiovascular:      Rate and Rhythm: Normal rate and regular rhythm.      Heart sounds: Normal heart sounds.   Pulmonary:      Effort: Pulmonary effort is normal. No respiratory distress.      Breath sounds: Normal breath sounds. No wheezing.   Abdominal:      General: Abdomen is flat.      Palpations: Abdomen is soft.   Musculoskeletal:      Cervical back: Neck supple.   Skin:     General: Skin is warm and dry.   Neurological:      General: No focal deficit present.      Mental Status: He is alert and oriented to person, place, and time.      Cranial Nerves: No cranial nerve deficit.   Psychiatric:         Mood and Affect: Mood normal.         Behavior: Behavior normal.         ED Course     11:30 AM  The patient was seen and examined by Hill Morgan in Room ED06.     Procedures            Results for orders placed or performed during the hospital encounter of 09/14/21   CBC with platelets differential     Status: None    Narrative    The following orders were created for panel order CBC with platelets differential.  Procedure                               Abnormality         Status                      ---------                               -----------         ------                     CBC with platelets and d...[[id number]]                      Final result                 Please view results for these tests on the individual orders.   Comprehensive metabolic panel     Status: Abnormal   Result Value Ref Range    Sodium 136 133 - 144 mmol/L    Potassium 3.8 3.4 - 5.3 mmol/L    Chloride 104 94 - 109 mmol/L    Carbon Dioxide (CO2) 28 20 - 32 mmol/L    Anion Gap 4 3 - 14 mmol/L    Urea Nitrogen 12 7 - 30 mg/dL    Creatinine 1.31 (H) 0.66 - 1.25 mg/dL    Calcium 9.1 8.5 - 10.1 mg/dL    Glucose 81 70 - 99 mg/dL    Alkaline Phosphatase 128 40 - 150 U/L    AST 26 0 - 45 U/L    ALT 33 0 - 70 U/L    Protein Total 8.4 6.8 - 8.8 g/dL    Albumin 4.0 3.4 - 5.0 g/dL    Bilirubin Total 0.5 0.2 - 1.3 mg/dL    GFR Estimate 70 >60 mL/min/1.73m2   Lactic acid whole blood     Status: Normal   Result Value Ref Range    Lactic Acid 1.2 0.7 - 2.0 mmol/L   Lipase     Status: Normal   Result Value Ref Range    Lipase 183 73 - 393 U/L   Symptomatic COVID-19 Virus (Coronavirus) by PCR Nasopharyngeal     Status: Abnormal    Specimen: Nasopharyngeal; Swab   Result Value Ref Range    SARS CoV2 PCR Positive (A) Negative    Narrative    Testing was performed using the Xpert Xpress SARS-CoV-2 Assay on the  Cepheid Gene-Xpert Instrument Systems. Additional information about  this Emergency Use Authorization (EUA) assay can be found via the Lab  Guide. This test should be ordered for the detection of SARS-CoV-2 in  individuals who meet SARS-CoV-2 clinical and/or epidemiological  criteria. Test performance is unknown in asymptomatic patients. This  test is for in vitro diagnostic use under the FDA EUA for  laboratories certified under CLIA to perform high complexity testing.  This test has not been FDA cleared or approved. A negative result  does not rule out the presence of PCR inhibitors in the specimen or  target RNA in concentration below  the limit of detection for the  assay. The possibility of a false negative should be considered if  the patient's recent exposure or clinical presentation suggests  COVID-19. This test was validated by the [facility]. This laboratory is certified under  the Clinical Laboratory Improvement Amendments of 1988 (CLIA-88) as  qualified to perform high complexity laboratory testing.     CBC with platelets and differential     Status: None   Result Value Ref Range    WBC Count 6.2 4.0 - 11.0 10e3/uL    RBC Count 5.55 4.40 - 5.90 10e6/uL    Hemoglobin 15.9 13.3 - 17.7 g/dL    Hematocrit 46.7 40.0 - 53.0 %    MCV 84 78 - 100 fL    MCH 28.6 26.5 - 33.0 pg    MCHC 34.0 31.5 - 36.5 g/dL    RDW 12.3 10.0 - 15.0 %    Platelet Count 214 150 - 450 10e3/uL    % Neutrophils 72 %    % Lymphocytes 13 %    % Monocytes 14 %    % Eosinophils 1 %    % Basophils 0 %    % Immature Granulocytes 0 %    NRBCs per 100 WBC 0 <1 /100    Absolute Neutrophils 4.4 1.6 - 8.3 10e3/uL    Absolute Lymphocytes 0.8 0.8 - 5.3 10e3/uL    Absolute Monocytes 0.9 0.0 - 1.3 10e3/uL    Absolute Eosinophils 0.1 0.0 - 0.7 10e3/uL    Absolute Basophils 0.0 0.0 - 0.2 10e3/uL    Absolute Immature Granulocytes 0.0 <=0.0 10e3/uL    Absolute NRBCs 0.0 10e3/uL     Medications   lactated ringers BOLUS 1,000 mL (0 mLs Intravenous Stopped 9/14/21 1310)     Followed by   lactated ringers BOLUS 1,000 mL (0 mLs Intravenous Stopped 9/14/21 1426)     Followed by   lactated ringers infusion ( Intravenous Canceled Entry 9/14/21 1434)   acetaminophen (TYLENOL) tablet 975 mg (975 mg Oral Given 9/14/21 1152)   ibuprofen (ADVIL/MOTRIN) tablet 600 mg (600 mg Oral Given 9/14/21 1152)        Assessments & Plan (with Medical Decision Making)   36-year-old male presents with headache and fevers he did not do Covid vaccination.  His Covid test is returned positive.  He is not hypoxic will discharge home with acetaminophen and ibuprofen they  requested prescriptions for these.  I ordered the [facility] get well loop for Covid.  He should follow-up with his primary care provider isolate to prevent spread to others return if your condition deteriorates.    I have reviewed the nursing notes. I have reviewed the findings, diagnosis, plan and need for follow up with the patient.    New Prescriptions    ACETAMINOPHEN (TYLENOL) 500 MG TABLET    Take 1-2 tablets (500-1,000 mg) by mouth every 6 hours as needed for pain or fever    IBUPROFEN (ADVIL/MOTRIN) 600 MG TABLET    Take 1 tablet (600 mg) by mouth every 8 hours as needed for moderate pain       Final diagnoses:   Acute nonintractable headache, unspecified headache type   Fever, unspecified fever cause   Infection due to 2019 novel coronavirus     I, Chad Acosta, am serving as a trained medical scribe to document services personally performed by Hill Morgan MD, based on the provider's statements to me.      I, Hill Morgan MD, was physically present and have reviewed and verified the accuracy of this note documented by Chad Acosta.   --  Hill Morgan MD  [facility] EMERGENCY DEPARTMENT  9/14/2021     Hill Morgan MD  09/14/21 1559

## 2021-09-14 NOTE — DISCHARGE INSTRUCTIONS
Your Covid test was positive  Isolate to prevent spread to others  Use Tylenol and ibuprofen for pain and fevers  Follow-up with your primary care provider

## 2021-09-14 NOTE — ED NOTES
Pt wife stating that she does not believe the positive Covid results. Nurse advise wife- Mita to go to [facility] website to check on possible testing site to get tested for Covid.

## 2023-01-01 NOTE — TELEPHONE ENCOUNTER
ACNEIFORM RASH OF FACE    CLEOCIN 1% LOTION SENT IN     SIDE EFFECTS BENEFITS AND RISKS DISCUSSED      TREATMENT PROGNOSIS BENEFITS AND RISKS DISCUSSED     MEDICATION  PLEASE NOTIFY PATIENT IN PHARMACY     RESULTS TO PATIENT     AMINATA REYES JR., MD   
Wife says that at last OV you had mentioned a facial cream.  Patient did not receive. Writer does not see reference to one in the OV notes.   
 Delivery

## 2024-01-15 NOTE — TELEPHONE ENCOUNTER
Called spouse to verify how he would like to receive letter. Letter placed up front for spouse to .    Refill was denied and sent with providers message. THIS MAY BE STOPPED.